# Patient Record
Sex: FEMALE | Race: ASIAN | NOT HISPANIC OR LATINO
[De-identification: names, ages, dates, MRNs, and addresses within clinical notes are randomized per-mention and may not be internally consistent; named-entity substitution may affect disease eponyms.]

---

## 2019-09-22 LAB
APPEARANCE: CLEAR
BACTERIA UR CULT: NORMAL
BACTERIA: NEGATIVE
BILIRUBIN URINE: NEGATIVE
BLOOD URINE: NEGATIVE
COLOR: COLORLESS
GLUCOSE QUALITATIVE U: NEGATIVE
HYALINE CASTS: 0 /LPF
KETONES URINE: NEGATIVE
LEUKOCYTE ESTERASE URINE: NEGATIVE
MICROSCOPIC-UA: NORMAL
NITRITE URINE: NEGATIVE
PH URINE: 7
PROTEIN URINE: NEGATIVE
RED BLOOD CELLS URINE: 0 /HPF
SPECIFIC GRAVITY URINE: 1
SQUAMOUS EPITHELIAL CELLS: 1 /HPF
UROBILINOGEN URINE: NORMAL
WHITE BLOOD CELLS URINE: 0 /HPF

## 2019-10-02 ENCOUNTER — TRANSCRIPTION ENCOUNTER (OUTPATIENT)
Age: 24
End: 2019-10-02

## 2019-10-02 ENCOUNTER — RESULT REVIEW (OUTPATIENT)
Age: 24
End: 2019-10-02

## 2019-10-07 ENCOUNTER — APPOINTMENT (OUTPATIENT)
Dept: MRI IMAGING | Facility: CLINIC | Age: 24
End: 2019-10-07

## 2019-10-09 ENCOUNTER — APPOINTMENT (OUTPATIENT)
Dept: INTERNAL MEDICINE | Facility: CLINIC | Age: 24
End: 2019-10-09
Payer: COMMERCIAL

## 2019-10-09 VITALS
SYSTOLIC BLOOD PRESSURE: 100 MMHG | DIASTOLIC BLOOD PRESSURE: 62 MMHG | BODY MASS INDEX: 32.4 KG/M2 | WEIGHT: 140 LBS | HEIGHT: 55 IN | HEART RATE: 78 BPM | OXYGEN SATURATION: 99 %

## 2019-10-09 DIAGNOSIS — L21.9 SEBORRHEIC DERMATITIS, UNSPECIFIED: ICD-10-CM

## 2019-10-09 PROCEDURE — G0444 DEPRESSION SCREEN ANNUAL: CPT | Mod: 59

## 2019-10-09 PROCEDURE — G0442 ANNUAL ALCOHOL SCREEN 15 MIN: CPT | Mod: 59

## 2019-10-09 PROCEDURE — 99385 PREV VISIT NEW AGE 18-39: CPT

## 2019-10-10 ENCOUNTER — APPOINTMENT (OUTPATIENT)
Dept: ULTRASOUND IMAGING | Facility: IMAGING CENTER | Age: 24
End: 2019-10-10
Payer: COMMERCIAL

## 2019-10-10 ENCOUNTER — OUTPATIENT (OUTPATIENT)
Dept: OUTPATIENT SERVICES | Facility: HOSPITAL | Age: 24
LOS: 1 days | End: 2019-10-10
Payer: COMMERCIAL

## 2019-10-10 DIAGNOSIS — Z00.8 ENCOUNTER FOR OTHER GENERAL EXAMINATION: ICD-10-CM

## 2019-10-10 PROCEDURE — 76856 US EXAM PELVIC COMPLETE: CPT

## 2019-10-10 PROCEDURE — 76856 US EXAM PELVIC COMPLETE: CPT | Mod: 26

## 2019-10-13 PROBLEM — L21.9 SEBORRHEIC DERMATITIS: Status: ACTIVE | Noted: 2019-10-09

## 2019-10-13 NOTE — PHYSICAL EXAM
[No Acute Distress] : no acute distress [Well Nourished] : well nourished [Well Developed] : well developed [Well-Appearing] : well-appearing [Normal Sclera/Conjunctiva] : normal sclera/conjunctiva [PERRL] : pupils equal round and reactive to light [EOMI] : extraocular movements intact [Normal Outer Ear/Nose] : the outer ears and nose were normal in appearance [Normal Oropharynx] : the oropharynx was normal [No JVD] : no jugular venous distention [No Lymphadenopathy] : no lymphadenopathy [Supple] : supple [Thyroid Normal, No Nodules] : the thyroid was normal and there were no nodules present [No Respiratory Distress] : no respiratory distress  [No Accessory Muscle Use] : no accessory muscle use [Clear to Auscultation] : lungs were clear to auscultation bilaterally [Normal Rate] : normal rate  [Regular Rhythm] : with a regular rhythm [Normal S1, S2] : normal S1 and S2 [No Murmur] : no murmur heard [No Carotid Bruits] : no carotid bruits [No Abdominal Bruit] : a ~M bruit was not heard ~T in the abdomen [No Varicosities] : no varicosities [Pedal Pulses Present] : the pedal pulses are present [No Edema] : there was no peripheral edema [No Palpable Aorta] : no palpable aorta [No Extremity Clubbing/Cyanosis] : no extremity clubbing/cyanosis [Soft] : abdomen soft [Non Tender] : non-tender [Non-distended] : non-distended [No Masses] : no abdominal mass palpated [No HSM] : no HSM [Normal Bowel Sounds] : normal bowel sounds [Normal Posterior Cervical Nodes] : no posterior cervical lymphadenopathy [Normal Anterior Cervical Nodes] : no anterior cervical lymphadenopathy [No CVA Tenderness] : no CVA  tenderness [No Spinal Tenderness] : no spinal tenderness [No Joint Swelling] : no joint swelling [Grossly Normal Strength/Tone] : grossly normal strength/tone [Coordination Grossly Intact] : coordination grossly intact [No Focal Deficits] : no focal deficits [Normal Gait] : normal gait [Deep Tendon Reflexes (DTR)] : deep tendon reflexes were 2+ and symmetric [Speech Grossly Normal] : speech grossly normal [Memory Grossly Normal] : memory grossly normal [Normal Affect] : the affect was normal [Alert and Oriented x3] : oriented to person, place, and time [Normal Insight/Judgement] : insight and judgment were intact [de-identified] : hx seb dermatitis, noabnormal hair growth

## 2019-10-13 NOTE — HISTORY OF PRESENT ILLNESS
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 Has labs drawn by her Gyn Dr Fernandez with her today.\par \par Reports irregular menses since 2013 (age19) when in nursing school. Lapses of periods would occur up to 3-4 month, max up to 1 year with no period.\par Told she might have PCOS as per her Gyn Dr Fernandez. Labs drawn by Gyn showed normal levels total and free testosterone, elevated DHEA and markedly elevated Prolactin level (866.3 ng/ml in 2018 and over 8000ng/ml in 2019.\par Imaging showed small grnuloma in liver.\par She denies any headache, visual field changes, galactorrhea,or hirsutism.\par Has MRI brain/pituitary gland scheduled.\par Gyn-\par Never sexually active.

## 2019-10-13 NOTE — REVIEW OF SYSTEMS
[Negative] : Heme/Lymph [Hot Flashes] : no hot flashes [Headache] : no headache [Dizziness] : no dizziness [Unsteady Walking] : no ataxia [FreeTextEntry8] : irregulamenses [de-identified] : seb dermatitis

## 2019-10-13 NOTE — HEALTH RISK ASSESSMENT
[No] : No [No falls in past year] : Patient reported no falls in the past year [0] : 2) Feeling down, depressed, or hopeless: Not at all (0) [Patient reported PAP Smear was normal] : Patient reported PAP Smear was normal [Patient/Caregiver not ready to engage] : Patient/Caregiver not ready to engage [] : No [de-identified] : gyn [Audit-CScore] : 0 [[id number]] : 0 [PapSmearDate] : 10/19 [PapSmearComments] : Dr Fernandez

## 2019-10-18 ENCOUNTER — OUTPATIENT (OUTPATIENT)
Dept: OUTPATIENT SERVICES | Facility: HOSPITAL | Age: 24
LOS: 1 days | End: 2019-10-18
Payer: COMMERCIAL

## 2019-10-18 ENCOUNTER — APPOINTMENT (OUTPATIENT)
Dept: MRI IMAGING | Facility: IMAGING CENTER | Age: 24
End: 2019-10-18
Payer: COMMERCIAL

## 2019-10-18 DIAGNOSIS — Z00.8 ENCOUNTER FOR OTHER GENERAL EXAMINATION: ICD-10-CM

## 2019-10-18 PROCEDURE — A9585: CPT

## 2019-10-18 PROCEDURE — 70553 MRI BRAIN STEM W/O & W/DYE: CPT | Mod: 26

## 2019-10-18 PROCEDURE — 70553 MRI BRAIN STEM W/O & W/DYE: CPT

## 2019-10-21 ENCOUNTER — FORM ENCOUNTER (OUTPATIENT)
Age: 24
End: 2019-10-21

## 2019-10-22 ENCOUNTER — OUTPATIENT (OUTPATIENT)
Dept: OUTPATIENT SERVICES | Facility: HOSPITAL | Age: 24
LOS: 1 days | End: 2019-10-22
Payer: COMMERCIAL

## 2019-10-22 ENCOUNTER — APPOINTMENT (OUTPATIENT)
Dept: ULTRASOUND IMAGING | Facility: IMAGING CENTER | Age: 24
End: 2019-10-22
Payer: COMMERCIAL

## 2019-10-22 DIAGNOSIS — K75.3 GRANULOMATOUS HEPATITIS, NOT ELSEWHERE CLASSIFIED: ICD-10-CM

## 2019-10-22 PROCEDURE — 76700 US EXAM ABDOM COMPLETE: CPT

## 2019-10-22 PROCEDURE — 76700 US EXAM ABDOM COMPLETE: CPT | Mod: 26

## 2019-10-23 LAB
ALBUMIN SERPL ELPH-MCNC: 4.8 G/DL
ALP BLD-CCNC: 84 U/L
ALT SERPL-CCNC: 12 U/L
ANION GAP SERPL CALC-SCNC: 13 MMOL/L
AST SERPL-CCNC: 34 U/L
BASOPHILS # BLD AUTO: 0.07 K/UL
BASOPHILS NFR BLD AUTO: 1 %
BILIRUB SERPL-MCNC: 0.4 MG/DL
BUN SERPL-MCNC: 13 MG/DL
CALCIUM SERPL-MCNC: 9.9 MG/DL
CHLORIDE SERPL-SCNC: 102 MMOL/L
CHOLEST SERPL-MCNC: 142 MG/DL
CHOLEST/HDLC SERPL: 2.4 RATIO
CO2 SERPL-SCNC: 25 MMOL/L
CREAT SERPL-MCNC: 0.64 MG/DL
EOSINOPHIL # BLD AUTO: 0.15 K/UL
EOSINOPHIL NFR BLD AUTO: 2.1 %
ESTIMATED AVERAGE GLUCOSE: 114 MG/DL
GLUCOSE SERPL-MCNC: 84 MG/DL
HBA1C MFR BLD HPLC: 5.6 %
HCT VFR BLD CALC: 39.6 %
HDLC SERPL-MCNC: 60 MG/DL
HGB BLD-MCNC: 12.6 G/DL
IMM GRANULOCYTES NFR BLD AUTO: 0.3 %
LDLC SERPL CALC-MCNC: 72 MG/DL
LYMPHOCYTES # BLD AUTO: 2.82 K/UL
LYMPHOCYTES NFR BLD AUTO: 39.2 %
MAN DIFF?: NORMAL
MCHC RBC-ENTMCNC: 28.8 PG
MCHC RBC-ENTMCNC: 31.8 GM/DL
MCV RBC AUTO: 90.4 FL
MONOCYTES # BLD AUTO: 0.39 K/UL
MONOCYTES NFR BLD AUTO: 5.4 %
NEUTROPHILS # BLD AUTO: 3.75 K/UL
NEUTROPHILS NFR BLD AUTO: 52 %
PLATELET # BLD AUTO: 278 K/UL
POTASSIUM SERPL-SCNC: 4.1 MMOL/L
PROLACTIN SERPL-MCNC: 2659 NG/ML
PROT SERPL-MCNC: 8.1 G/DL
RBC # BLD: 4.38 M/UL
RBC # FLD: 12.9 %
SODIUM SERPL-SCNC: 140 MMOL/L
TRIGL SERPL-MCNC: 49 MG/DL
WBC # FLD AUTO: 7.2 K/UL

## 2019-10-25 ENCOUNTER — APPOINTMENT (OUTPATIENT)
Dept: NEUROSURGERY | Facility: CLINIC | Age: 24
End: 2019-10-25

## 2019-10-28 ENCOUNTER — APPOINTMENT (OUTPATIENT)
Dept: ENDOCRINOLOGY | Facility: CLINIC | Age: 24
End: 2019-10-28
Payer: COMMERCIAL

## 2019-10-28 ENCOUNTER — RX CHANGE (OUTPATIENT)
Age: 24
End: 2019-10-28

## 2019-10-28 VITALS
HEART RATE: 88 BPM | DIASTOLIC BLOOD PRESSURE: 60 MMHG | OXYGEN SATURATION: 98 % | BODY MASS INDEX: 23.32 KG/M2 | HEIGHT: 65 IN | SYSTOLIC BLOOD PRESSURE: 108 MMHG | WEIGHT: 140 LBS

## 2019-10-28 PROCEDURE — 99205 OFFICE O/P NEW HI 60 MIN: CPT

## 2019-10-29 LAB — DHEA-SULFATE, SERUM: 494 UG/DL

## 2019-10-30 NOTE — HISTORY OF PRESENT ILLNESS
[FreeTextEntry1] : 24 year old female with no significant PMH presents for initial visit for new diagnosis of macroprolactinoma.\par \par Patient reports first started to have symptoms when started to have irregular periods in 2016. Saw PCP in 2018. Had hormone levels checked at that time and reports prolactin was 800s at that time. Reports was told likely 2/2 possible PCOS and was being monitored. \par Since then saw obgyn and prolactin was rechecked and was 2600s. 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 Reports can skip a month every 3-4 months. Spotting in between. Menarch 12. No hx of pregnancy. \par Denies galctorrhea, breast tenderness. No weight gain. No changes in hand/shoe size. Denies headaches or vision changes. Does report that went to optho a few months ago and was told that has some peripheral visual field deficits but was being monitored. Pt notices no changes in vision.\par Other labs: DHEA 494. Testosterone 26.6, Ft4 1.2, TSH 1.09, 17 hydroxyproges 23\par FSH 6.4, LH 12.6, estradiol 45\par A1C 5.6\par Also had an US pelvis showing normal ovaries.\par \par No family hx of pituitary abd. No adrenal or pancreatic issues

## 2019-10-30 NOTE — DATA REVIEWED
[FreeTextEntry1] : \par EXAM: MR BRAIN WAW IC \par \par \par PROCEDURE DATE: 10/18/2019 \par \par \par \par INTERPRETATION: Contrast-enhanced MRI of the brain. \par \par CLINICAL INDICATION: Increased prolactin levels \par \par TECHNIQUE: Multiplanar, multisequence MR images of the brain were obtained \par before and after the dynamic intravenous administration of 7 cc of Gadavist. \par 0.5 cc were discarded. Special attention was paid to the sellar and \par suprasellar regions. \par \par COMPARISON: None available \par \par FINDINGS: \par \par There is a 1.9 x 2.2 x 2.0 cm (anterior-posterior by transverse by \par craniocaudad) relatively hypoenhancing sellar and suprasellar mass, most \par consistent with the presence of a macroadenoma. \par \par The infundibulum is deviated to the right. Uniformly enhancing native \par pituitary gland is noted in the right aspect of the sella and draped over \par the right and superior aspect of the lesion. \par \par The lesion invades the left cavernous sinus. The left precavernous and \par cavernous ICA is encased by the lesion. The proximal left cavernous flow \par void is minimally narrowed. The inferior medial aspects of the supraclinoid \par internal carotid arteries approximate the lesion. \par \par There is mild mass effect upon the right aspect of the optic chiasm. \par \par No hydrocephalus, midline shift, acute intracranial hemorrhage, vasogenic \par edema, or acute territorial infarct. No focal parenchymal signal abnormality. \par \par No abnormal parenchymal or leptomeningeal enhancement. \par \par Left maxillary and bilateral ethmoid sinus mucosal thickening is present. \par The mastoid air cells are clear. The orbits and cervicomedullary junction \par are unremarkable. \par \par IMPRESSION: \par \par Findings consistent with the presence of a pituitary macroadenoma, as \par described in the report. \par \par LEIGHTON GUTIÉRREZ M.D., ATTENDING RADIOLOGIST \par This document has been electronically signed. Oct 22 2019 11:28AM \par

## 2019-10-30 NOTE — PHYSICAL EXAM
[Alert] : alert [No Acute Distress] : no acute distress [Well Nourished] : well nourished [Well Developed] : well developed [Normal Sclera/Conjunctiva] : normal sclera/conjunctiva [PERRL] : pupils equal, round and reactive to light [EOMI] : extra ocular movement intact [Normal Lips/Gums] : the lips and gums were normal [Normal Oropharynx] : the oropharynx was normal [No Neck Mass] : no neck mass was observed [Supple] : the neck was supple [No LAD] : no lymphadenopathy [Thyroid Not Enlarged] : the thyroid was not enlarged [No Thyroid Nodules] : there were no palpable thyroid nodules [No Respiratory Distress] : no respiratory distress [Normal Rate and Effort] : normal respiratory rhythm and effort [No Accessory Muscle Use] : no accessory muscle use [Clear to Auscultation] : lungs were clear to auscultation bilaterally [Normal Rate] : heart rate was normal  [Normal S1, S2] : normal S1 and S2 [Regular Rhythm] : with a regular rhythm [Murmurs] : no murmurs [No Rubs] : no pericardial rub [No Edema] : there was no peripheral edema [Normal Bowel Sounds] : normal bowel sounds [Not Tender] : non-tender [Soft] : abdomen soft [Not Distended] : not distended [Post Cervical Nodes] : posterior cervical nodes [Anterior Cervical Nodes] : anterior cervical nodes [Normal] : normal and non tender [No Stigmata of Cushings Syndrome] : no stigmata of cushings syndrome [Normal Gait] : normal gait [No Joint Swelling] : no joint swelling seen [No Clubbing, Cyanosis] : no clubbing  or cyanosis of the fingernails [Normal Strength/Tone] : muscle strength and tone were normal [No Rash] : no rash [Cranial Nerves Intact] : cranial nerves 2-12 were intact [Normal Reflexes] : deep tendon reflexes were 2+ and symmetric [No Motor Deficits] : the motor exam was normal [No Tremors] : no tremors [Oriented x3] : oriented to person, place, and time [Normal Insight/Judgement] : insight and judgment were intact [Normal Affect] : the affect was normal [Normal Mood] : the mood was normal [Abdominal Striae] : no abdominal striae [Hirsutism] : no hirsutism

## 2019-10-30 NOTE — ASSESSMENT
[FreeTextEntry1] : 24 year old female with no significant PMH presents for initial visit for new diagnosis of macroprolactinoma.\par \par With with pituitary macroadenoma likely prolactinoma with prolactin levels >200.\par Patient with normal Ft4, LH/FSH/estradiol low.\par For completeness, will check AM cortisol and ACTH as well as midnight salivary cortisol to r/o for cortisol secretion\par Check IGF-1 level\par Will start low dose cabergoline for now 0.25mg twice a week and will uptitrate further based on prolactin. Plan to repeat prolactin in 1 month on therapy. Risks/benefits of starting therapy discussed with pt\par Likely plan for repeat MRI in 6 mos\par C/w optho f/u for formal visual field testing\par D/w patient that should also establish care with neurosurgery. Will for now start cabergoline and expect for good response, however given extensive size of adenoma should see nsx as well for monitoring.\par \par F/U in 1 month\par \par D/W / Cristina

## 2019-11-08 LAB
ACTH SER-ACNC: 32 PG/ML
CORTIS SAL-MCNC: NORMAL
CORTIS SERPL-MCNC: 13.9 UG/DL
IGF BP1 SERPL-MCNC: 249 NG/ML

## 2019-11-11 LAB
MONOMERIC PROLACTIN (ICMA)*: 2366 NG/ML
PERCENT MACROPROLACTIN: 3 %
PROLACTIN, SERUM (ICMA)*: 2428 NG/ML

## 2019-11-20 ENCOUNTER — APPOINTMENT (OUTPATIENT)
Dept: ENDOCRINOLOGY | Facility: CLINIC | Age: 24
End: 2019-11-20
Payer: COMMERCIAL

## 2019-11-20 VITALS
HEART RATE: 80 BPM | BODY MASS INDEX: 23.32 KG/M2 | OXYGEN SATURATION: 96 % | DIASTOLIC BLOOD PRESSURE: 60 MMHG | SYSTOLIC BLOOD PRESSURE: 110 MMHG | WEIGHT: 140 LBS | HEIGHT: 65 IN

## 2019-11-20 PROCEDURE — 99213 OFFICE O/P EST LOW 20 MIN: CPT

## 2019-11-20 NOTE — PHYSICAL EXAM
[Alert] : alert [No Acute Distress] : no acute distress [Well Nourished] : well nourished [Well Developed] : well developed [Normal Sclera/Conjunctiva] : normal sclera/conjunctiva [EOMI] : extra ocular movement intact [No Proptosis] : no proptosis [Normal Oropharynx] : the oropharynx was normal [Thyroid Not Enlarged] : the thyroid was not enlarged [No Thyroid Nodules] : there were no palpable thyroid nodules [No Respiratory Distress] : no respiratory distress [No Accessory Muscle Use] : no accessory muscle use [Clear to Auscultation] : lungs were clear to auscultation bilaterally [Normal S1, S2] : normal S1 and S2 [Normal Rate] : heart rate was normal  [Regular Rhythm] : with a regular rhythm [Pedal Pulses Normal] : the pedal pulses are present [No Edema] : there was no peripheral edema [Not Tender] : non-tender [Normal Bowel Sounds] : normal bowel sounds [Soft] : abdomen soft [Post Cervical Nodes] : posterior cervical nodes [Not Distended] : not distended [Axillary Nodes] : axillary nodes [Anterior Cervical Nodes] : anterior cervical nodes [No Spinal Tenderness] : no spinal tenderness [Spine Straight] : spine straight [Normal] : normal and non tender [Normal Gait] : normal gait [No Stigmata of Cushings Syndrome] : no stigmata of cushings syndrome [No Rash] : no rash [Normal Strength/Tone] : muscle strength and tone were normal [No Tremors] : no tremors [Normal Reflexes] : deep tendon reflexes were 2+ and symmetric [Oriented x3] : oriented to person, place, and time [Acanthosis Nigricans] : no acanthosis nigricans

## 2019-11-20 NOTE — ASSESSMENT
[FreeTextEntry1] : Ms. TR MALONE is 24 year old here for follow up prolactinoma. \par \par #1 Prolactinoma\par Will check prolactin level today, \par Likely will need to increase cabergoline dosing due to significant elevated level. \par Will give referral to neuro opthal for visual field testing. \par Risks/benefits of starting therapy discussed with pt\par Likely plan for repeat MRI in 6 mos\par C/w optho f/u for formal visual field testing\par D/w patient that should also establish care with neurosurgery. Will for now start cabergoline and expect for good response, however given extensive size of adenoma should see neurosurgery as well for monitoring.\par \par \par \par

## 2019-11-20 NOTE — HISTORY OF PRESENT ILLNESS
[FreeTextEntry1] : 24 year old female with no significant PMH presents for initial visit for new diagnosis of macroprolactinoma.\par \par Patient reports first started to have symptoms when started to have irregular periods in 2016. Saw PCP in 2018. Had hormone levels checked at that time and reports prolactin was 800s at that time. Reports was told likely 2/2 possible PCOS and was being monitored. \par \par Since then saw OBGYN and prolactin was rechecked and was 2600s. 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 Reports can skip a month every 3-4 months. Spotting in between. Menarche 12. No prior history of pregnancy. \par \par Denies galactorrhea, breast tenderness. No weight gain. No changes in hand/shoe size. Denies headaches or vision changes. Does report that went to optho a few months ago and was told that has some peripheral visual field deficits but was being monitored. Pt notices no changes in vision.  She has not had visual field testing done yet.  \par \par No family hx of pituitary abd. No adrenal or pancreatic issues \par \par She didn't noticed any signficant changes since starting Cabergoline.  She does feel a little tired. \par

## 2019-11-21 ENCOUNTER — RX RENEWAL (OUTPATIENT)
Age: 24
End: 2019-11-21

## 2019-11-21 LAB — PROLACTIN SERPL-MCNC: 593 NG/ML

## 2020-01-22 ENCOUNTER — APPOINTMENT (OUTPATIENT)
Dept: ENDOCRINOLOGY | Facility: CLINIC | Age: 25
End: 2020-01-22
Payer: COMMERCIAL

## 2020-01-22 VITALS
DIASTOLIC BLOOD PRESSURE: 80 MMHG | WEIGHT: 136 LBS | BODY MASS INDEX: 22.66 KG/M2 | OXYGEN SATURATION: 98 % | HEART RATE: 75 BPM | HEIGHT: 65 IN | SYSTOLIC BLOOD PRESSURE: 110 MMHG

## 2020-01-22 PROCEDURE — 99213 OFFICE O/P EST LOW 20 MIN: CPT

## 2020-01-22 NOTE — PHYSICAL EXAM
[Alert] : alert [No Acute Distress] : no acute distress [Well Nourished] : well nourished [Normal Sclera/Conjunctiva] : normal sclera/conjunctiva [Well Developed] : well developed [EOMI] : extra ocular movement intact [No Proptosis] : no proptosis [Normal Oropharynx] : the oropharynx was normal [Thyroid Not Enlarged] : the thyroid was not enlarged [No Respiratory Distress] : no respiratory distress [No Thyroid Nodules] : there were no palpable thyroid nodules [No Accessory Muscle Use] : no accessory muscle use [Clear to Auscultation] : lungs were clear to auscultation bilaterally [Normal Rate] : heart rate was normal  [Normal S1, S2] : normal S1 and S2 [Regular Rhythm] : with a regular rhythm [Pedal Pulses Normal] : the pedal pulses are present [No Edema] : there was no peripheral edema [Normal Bowel Sounds] : normal bowel sounds [Not Tender] : non-tender [Soft] : abdomen soft [Not Distended] : not distended [Post Cervical Nodes] : posterior cervical nodes [Anterior Cervical Nodes] : anterior cervical nodes [Axillary Nodes] : axillary nodes [Normal] : normal and non tender [No Spinal Tenderness] : no spinal tenderness [Spine Straight] : spine straight [No Stigmata of Cushings Syndrome] : no stigmata of cushings syndrome [Normal Gait] : normal gait [Normal Strength/Tone] : muscle strength and tone were normal [No Rash] : no rash [Normal Reflexes] : deep tendon reflexes were 2+ and symmetric [No Tremors] : no tremors [Oriented x3] : oriented to person, place, and time [Acanthosis Nigricans] : no acanthosis nigricans

## 2020-01-22 NOTE — DATA REVIEWED
[FreeTextEntry1] : EXAM: MR BRAIN WAW IC \par \par PROCEDURE DATE: 10/18/2019 \par \par INTERPRETATION: Contrast-enhanced MRI of the brain. \par \par CLINICAL INDICATION: Increased prolactin levels \par \par TECHNIQUE: Multiplanar, multisequence MR images of the brain were obtained \par before and after the dynamic intravenous administration of 7 cc of Gadavist. \par 0.5 cc were discarded. Special attention was paid to the sellar and \par suprasellar regions. \par \par COMPARISON: None available \par \par FINDINGS: \par \par There is a 1.9 x 2.2 x 2.0 cm (anterior-posterior by transverse by \par craniocaudad) relatively hypoenhancing sellar and suprasellar mass, most \par consistent with the presence of a macroadenoma. \par \par The infundibulum is deviated to the right. Uniformly enhancing native \par pituitary gland is noted in the right aspect of the sella and draped over \par the right and superior aspect of the lesion. \par \par The lesion invades the left cavernous sinus. The left precavernous and \par cavernous ICA is encased by the lesion. The proximal left cavernous flow \par void is minimally narrowed. The inferior medial aspects of the supraclinoid \par internal carotid arteries approximate the lesion. \par \par There is mild mass effect upon the right aspect of the optic chiasm. \par \par No hydrocephalus, midline shift, acute intracranial hemorrhage, vasogenic \par edema, or acute territorial infarct. No focal parenchymal signal abnormality. \par \par No abnormal parenchymal or leptomeningeal enhancement. \par \par Left maxillary and bilateral ethmoid sinus mucosal thickening is present. \par The mastoid air cells are clear. The orbits and cervicomedullary junction \par are unremarkable. \par \par IMPRESSION: \par \par Findings consistent with the presence of a pituitary macroadenoma, as \par described in the report. \par \par LEIGHTON GUTIÉRREZ M.D., ATTENDING RADIOLOGIST \par This document has been electronically signed. Oct 22 2019 11:28AM \par

## 2020-01-22 NOTE — ASSESSMENT
[FreeTextEntry1] : Ms. TR MALONE is 24 year old here for follow up prolactinoma. \par \par #1 Prolactinoma\par Will check prolactin level today, will likely need to increase Cabergoline dosing if level is still not at goal.  \par Will give referral to neuro Ophthalmology for complete visual field testing and to establish care. \par \par \par #2 Elevated DHEAS level\par May be secondary to elevated prolactin, will repeat level now prolactin level is more stable.  \par \par

## 2020-01-22 NOTE — HISTORY OF PRESENT ILLNESS
[FreeTextEntry1] : Ms. TR MALONE is a  24 year old female with no significant PMH presents for follow up visit for new macroprolactinoma.  Diagnosed in Oct 2019.  \par \par Patient reports first started to have symptoms when started to have irregular periods in 2016. Saw PCP in 2018. Had hormone levels checked at that time and reports prolactin was 800s at that time. Reports was told likely 2/2 possible PCOS and was being monitored. \par \par Since then saw OBGYN and prolactin was rechecked and was 2600s. 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 Reports can skip a month every 3-4 months. Spotting in between. Menarche 12. No prior history of pregnancy.   \par \par She states that her last menstrual period was Dec 5th, 2019, prior to that was Oct 17, 2019.  \par \par She made an appointment to see Ophthalmology but will be cancelling that appointment.  She has not establish care with Neuro-ophthalmology yet.   \par \par She reports feeling well.  Just returned from a 2 week trip from [place].  Denies any headache, any changes in her vision.\par

## 2020-01-23 ENCOUNTER — APPOINTMENT (OUTPATIENT)
Dept: OPHTHALMOLOGY | Facility: CLINIC | Age: 25
End: 2020-01-23

## 2020-01-24 LAB
PROLACTIN SERPL-MCNC: 300 NG/ML
PROLACTIN SERPL-MCNC: 339 NG/ML

## 2020-01-28 LAB — DHEA-SULFATE, SERUM: 301 UG/DL

## 2020-04-20 ENCOUNTER — TRANSCRIPTION ENCOUNTER (OUTPATIENT)
Age: 25
End: 2020-04-20

## 2020-04-21 ENCOUNTER — TRANSCRIPTION ENCOUNTER (OUTPATIENT)
Age: 25
End: 2020-04-21

## 2020-04-26 ENCOUNTER — MESSAGE (OUTPATIENT)
Age: 25
End: 2020-04-26

## 2020-04-29 ENCOUNTER — APPOINTMENT (OUTPATIENT)
Dept: OPHTHALMOLOGY | Facility: CLINIC | Age: 25
End: 2020-04-29

## 2020-04-29 ENCOUNTER — APPOINTMENT (OUTPATIENT)
Dept: ENDOCRINOLOGY | Facility: CLINIC | Age: 25
End: 2020-04-29
Payer: COMMERCIAL

## 2020-04-29 PROCEDURE — 99213 OFFICE O/P EST LOW 20 MIN: CPT | Mod: 95

## 2020-04-29 NOTE — REASON FOR VISIT
[Home] : at home, [unfilled] , at the time of the visit. [Patient] : the patient [Medical Office: ([facility])___] : at the medical office located in

## 2020-04-29 NOTE — ASSESSMENT
[FreeTextEntry1] : Ms. TR MALONE is 24 year old here for follow up prolactinoma. \par \par #1 Prolactinoma\par Continue with cabergoline 0.5 mg twice weekly.  Patient has been tolerating the medication well.\par Repeat MRI of the pituitary gland in 2021.\par Patient will reschedule her neuro-ophthalmology visit.\par Repeat prolactin level, check LH, FSH and estradiol level as well.\par \par \par #2 Elevated DHEAS level\par May be secondary to elevated prolactin, we will repeat DHEAS level again today.  It was normal in January 2020.\par \par Endocrine follow-up in 4 months.\par

## 2020-04-29 NOTE — HISTORY OF PRESENT ILLNESS
[FreeTextEntry1] : Ms. TR MALONE is a  24 year old female with no significant PMH presents for follow up visit for new macroprolactinoma.  Diagnosed in Oct 2019.  \par \par Patient reports first started to have symptoms when started to have irregular periods in 2016. Saw PCP in 2018. Had hormone levels checked at that time and reports prolactin was 800s at that time. Reports was told likely 2/2 possible PCOS and was being monitored. \par \par Since then saw OBGYN and prolactin was rechecked and was 2600s. 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 Reports can skip a month every 3-4 months. Spotting in between. Menarche 12. No prior history of pregnancy.   \par \par She states that her last menstrual period was Dec 5th, 2019, prior to that was Oct 17, 2019.  \par \par She made an appointment to see Ophthalmology but will be cancelling that appointment.  She had an appointment with neuro-ophthalmologist today.  But she will be canceling that due to the current COVID-19 pandemic.  She will be rescheduling in a couple of months.\par \par She reports feeling well, she reported that her menses had restarted since February 2020.  She has been getting monthly menstrual.,  Although they are not exactly on the same day of the month.  \par

## 2020-04-29 NOTE — PHYSICAL EXAM
[Well Nourished] : well nourished [Alert] : alert [EOMI] : extra ocular movement intact [Normal Hearing] : hearing was normal [No Lid Lag] : no lid lag [No Neck Mass] : no neck mass was observed [No Accessory Muscle Use] : no accessory muscle use [No Respiratory Distress] : no respiratory distress [Normal Rate and Effort] : normal respiratory rate and effort [No Motor Deficits] : the motor exam was normal [No Clubbing, Cyanosis] : no clubbing  or cyanosis of the fingernails [Oriented x3] : oriented to person, place, and time [No Tremors] : no tremors [Normal Affect] : the affect was normal [Normal Insight/Judgement] : insight and judgment were intact [Normal Mood] : the mood was normal [de-identified] : Unable to perform auscultations [de-identified] : Unable to perform auscultation

## 2020-05-01 ENCOUNTER — APPOINTMENT (OUTPATIENT)
Dept: DISASTER EMERGENCY | Facility: CLINIC | Age: 25
End: 2020-05-01

## 2020-05-03 LAB
SARS-COV-2 IGG SERPL IA-ACNC: 0.2 RATIO
SARS-COV-2 IGG SERPL QL IA: NEGATIVE

## 2020-05-07 ENCOUNTER — APPOINTMENT (OUTPATIENT)
Dept: INTERNAL MEDICINE | Facility: CLINIC | Age: 25
End: 2020-05-07
Payer: COMMERCIAL

## 2020-05-07 PROCEDURE — 99214 OFFICE O/P EST MOD 30 MIN: CPT | Mod: 95

## 2020-05-07 NOTE — HISTORY OF PRESENT ILLNESS
[Home] : at home, [unfilled] , at the time of the visit. 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 no weakness in dorsiflexion of toes,therefore unlikely sciatica\par \par Total visit time 25 min

## 2020-05-07 NOTE — PHYSICAL EXAM
[No Spinal Tenderness] : no spinal tenderness [de-identified] : able to flex, neg SLR, noral strenth of both big toes

## 2020-05-13 LAB
DHEA-SULFATE, SERUM: 612 UG/DL
FSH SERPL-MCNC: 4.8 IU/L
LH SERPL-ACNC: 11.9 IU/L
PROLACTIN SERPL-MCNC: 287 NG/ML
T4 FREE SERPL-MCNC: 1.1 NG/DL

## 2020-06-04 ENCOUNTER — TRANSCRIPTION ENCOUNTER (OUTPATIENT)
Age: 25
End: 2020-06-04

## 2020-06-12 ENCOUNTER — LABORATORY RESULT (OUTPATIENT)
Age: 25
End: 2020-06-12

## 2020-06-19 ENCOUNTER — RESULT REVIEW (OUTPATIENT)
Age: 25
End: 2020-06-19

## 2020-06-19 ENCOUNTER — APPOINTMENT (OUTPATIENT)
Dept: MRI IMAGING | Facility: IMAGING CENTER | Age: 25
End: 2020-06-19
Payer: COMMERCIAL

## 2020-06-19 ENCOUNTER — OUTPATIENT (OUTPATIENT)
Dept: OUTPATIENT SERVICES | Facility: HOSPITAL | Age: 25
LOS: 1 days | End: 2020-06-19
Payer: COMMERCIAL

## 2020-06-19 DIAGNOSIS — D35.2 BENIGN NEOPLASM OF PITUITARY GLAND: ICD-10-CM

## 2020-06-19 PROCEDURE — A9585: CPT

## 2020-06-19 PROCEDURE — 70553 MRI BRAIN STEM W/O & W/DYE: CPT | Mod: 26

## 2020-06-19 PROCEDURE — 70553 MRI BRAIN STEM W/O & W/DYE: CPT

## 2020-09-14 ENCOUNTER — TRANSCRIPTION ENCOUNTER (OUTPATIENT)
Age: 25
End: 2020-09-14

## 2020-09-17 LAB — PROLACTIN SERPL-MCNC: 190 NG/ML

## 2020-09-25 LAB — DHEA-SULFATE, SERUM: 451 UG/DL

## 2020-10-12 ENCOUNTER — APPOINTMENT (OUTPATIENT)
Dept: INTERNAL MEDICINE | Facility: CLINIC | Age: 25
End: 2020-10-12
Payer: COMMERCIAL

## 2020-10-12 DIAGNOSIS — G57.00 LESION OF SCIATIC NERVE, UNSPECIFIED LOWER LIMB: ICD-10-CM

## 2020-10-12 DIAGNOSIS — M54.16 RADICULOPATHY, LUMBAR REGION: ICD-10-CM

## 2020-10-12 PROCEDURE — 99443: CPT

## 2020-10-12 PROCEDURE — 99212 OFFICE O/P EST SF 10 MIN: CPT | Mod: 95

## 2020-10-21 ENCOUNTER — APPOINTMENT (OUTPATIENT)
Dept: INTERNAL MEDICINE | Facility: CLINIC | Age: 25
End: 2020-10-21
Payer: COMMERCIAL

## 2020-10-21 VITALS
HEIGHT: 65 IN | BODY MASS INDEX: 21.83 KG/M2 | WEIGHT: 131 LBS | HEART RATE: 86 BPM | DIASTOLIC BLOOD PRESSURE: 60 MMHG | OXYGEN SATURATION: 98 % | SYSTOLIC BLOOD PRESSURE: 90 MMHG

## 2020-10-21 PROCEDURE — G0444 DEPRESSION SCREEN ANNUAL: CPT | Mod: NC,59

## 2020-10-21 PROCEDURE — 99395 PREV VISIT EST AGE 18-39: CPT | Mod: 25

## 2020-10-21 PROCEDURE — 99072 ADDL SUPL MATRL&STAF TM PHE: CPT

## 2020-10-21 PROCEDURE — G0442 ANNUAL ALCOHOL SCREEN 15 MIN: CPT | Mod: NC

## 2020-10-21 NOTE — PHYSICAL EXAM
[No Acute Distress] : no acute distress [Well Nourished] : well nourished [Well Developed] : well developed [Well-Appearing] : well-appearing [Normal Sclera/Conjunctiva] : normal sclera/conjunctiva [PERRL] : pupils equal round and reactive to light [EOMI] : extraocular movements intact [Normal Outer Ear/Nose] : the outer ears and nose were normal in appearance [Normal Oropharynx] : the oropharynx was normal [No JVD] : no jugular venous distention [No Lymphadenopathy] : no lymphadenopathy [Supple] : supple [Thyroid Normal, No Nodules] : the thyroid was normal and there were no nodules present [No Respiratory Distress] : no respiratory distress  [No Accessory Muscle Use] : no accessory muscle use [Clear to Auscultation] : lungs were clear to auscultation bilaterally [Normal Rate] : normal rate  [Regular Rhythm] : with a regular rhythm [Normal S1, S2] : normal S1 and S2 [No Murmur] : no murmur heard [No Carotid Bruits] : no carotid bruits [No Abdominal Bruit] : a ~M bruit was not heard ~T in the abdomen [No Varicosities] : no varicosities [Pedal Pulses Present] : the pedal pulses are present [No Edema] : there was no peripheral edema [No Palpable Aorta] : no palpable aorta [No Extremity Clubbing/Cyanosis] : no extremity clubbing/cyanosis [No Axillary Lymphadenopathy] : no axillary lymphadenopathy [Soft] : abdomen soft [Non Tender] : non-tender [Non-distended] : non-distended [No Masses] : no abdominal mass palpated [No HSM] : no HSM [Normal Bowel Sounds] : normal bowel sounds [Normal Posterior Cervical Nodes] : no posterior cervical lymphadenopathy [Normal Anterior Cervical Nodes] : no anterior cervical lymphadenopathy [No CVA Tenderness] : no CVA  tenderness [No Spinal Tenderness] : no spinal tenderness [No Joint Swelling] : no joint swelling [Grossly Normal Strength/Tone] : grossly normal strength/tone [No Rash] : no rash [Coordination Grossly Intact] : coordination grossly intact [No Focal Deficits] : no focal deficits [Normal Gait] : normal gait [Normal Affect] : the affect was normal [Normal Insight/Judgement] : insight and judgment were intact

## 2020-10-21 NOTE — HISTORY OF PRESENT ILLNESS
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

## 2020-10-21 NOTE — HEALTH RISK ASSESSMENT
[No] : No [No falls in past year] : Patient reported no falls in the past year [0] : 2) Feeling down, depressed, or hopeless: Not at all (0) [] : No [Audit-CScore] : 0 [[id number]] : 0

## 2020-11-18 ENCOUNTER — APPOINTMENT (OUTPATIENT)
Dept: OPHTHALMOLOGY | Facility: CLINIC | Age: 25
End: 2020-11-18
Payer: COMMERCIAL

## 2020-11-18 ENCOUNTER — APPOINTMENT (OUTPATIENT)
Dept: NEUROSURGERY | Facility: CLINIC | Age: 25
End: 2020-11-18
Payer: COMMERCIAL

## 2020-11-18 ENCOUNTER — NON-APPOINTMENT (OUTPATIENT)
Age: 25
End: 2020-11-18

## 2020-11-18 VITALS
SYSTOLIC BLOOD PRESSURE: 99 MMHG | BODY MASS INDEX: 21.83 KG/M2 | DIASTOLIC BLOOD PRESSURE: 65 MMHG | HEART RATE: 85 BPM | WEIGHT: 131 LBS | HEIGHT: 65 IN

## 2020-11-18 VITALS — TEMPERATURE: 97.3 F

## 2020-11-18 PROCEDURE — 99203 OFFICE O/P NEW LOW 30 MIN: CPT

## 2020-11-18 PROCEDURE — 92083 EXTENDED VISUAL FIELD XM: CPT

## 2020-11-18 PROCEDURE — 92133 CPTRZD OPH DX IMG PST SGM ON: CPT

## 2020-11-18 PROCEDURE — 92004 COMPRE OPH EXAM NEW PT 1/>: CPT

## 2020-11-18 NOTE — ASSESSMENT
[FreeTextEntry1] : 25 year old female with low back pain with history of radicular pain with possible HNP vs. piriformis syndrome.  Will obtain MRI lumbar spine to help delineate the exact pathology.  She will return to see me once she has completed the study so that we may review the results and discuss her further treatment options.

## 2020-11-18 NOTE — HISTORY OF PRESENT ILLNESS
[Back] : back [___ mths] : [unfilled] month(s) ago [2] : a current pain level of 2/10 [10] : a maximum pain level of 10/10 [Left] : left [Posterior] : posterior aspect of the [Feet] : feet [Tingling] : tingling [Insomnia] : insomnia [PT] : PT [Medications] : medications [de-identified] : "pinching" [FreeTextEntry2] : left leg [FreeTextEntry3] : sitting on a hard surface or leaning against a wall with legs outstretched [FreeTextEntry4] : stretching, piriformis stretch [FreeTextEntry6] : Meloxicam

## 2020-11-18 NOTE — CONSULT LETTER
[Dear  ___] : Dear ~CINDY, [Consult Letter:] : I had the pleasure of evaluating your patient, [unfilled]. [Please see my note below.] : Please see my note below. [Consult Closing:] : Thank you very much for allowing me to participate in the care of this patient.  If you have any questions, please do not hesitate to contact me. [Sincerely,] : Sincerely, [FreeTextEntry2] : Sherlyn Del Rosario MD\par [address]. \[place] [address] [FreeTextEntry3] : Jluis

## 2020-12-08 ENCOUNTER — OUTPATIENT (OUTPATIENT)
Dept: OUTPATIENT SERVICES | Facility: HOSPITAL | Age: 25
LOS: 1 days | End: 2020-12-08
Payer: COMMERCIAL

## 2020-12-08 ENCOUNTER — APPOINTMENT (OUTPATIENT)
Dept: MRI IMAGING | Facility: IMAGING CENTER | Age: 25
End: 2020-12-08
Payer: COMMERCIAL

## 2020-12-08 DIAGNOSIS — M54.16 RADICULOPATHY, LUMBAR REGION: ICD-10-CM

## 2020-12-08 PROCEDURE — 72148 MRI LUMBAR SPINE W/O DYE: CPT

## 2020-12-09 ENCOUNTER — RESULT REVIEW (OUTPATIENT)
Age: 25
End: 2020-12-09

## 2020-12-09 PROCEDURE — 72148 MRI LUMBAR SPINE W/O DYE: CPT | Mod: 26

## 2020-12-18 ENCOUNTER — APPOINTMENT (OUTPATIENT)
Dept: NEUROSURGERY | Facility: CLINIC | Age: 25
End: 2020-12-18
Payer: COMMERCIAL

## 2020-12-18 VITALS
DIASTOLIC BLOOD PRESSURE: 61 MMHG | BODY MASS INDEX: 21.66 KG/M2 | SYSTOLIC BLOOD PRESSURE: 110 MMHG | HEART RATE: 75 BPM | WEIGHT: 130 LBS | HEIGHT: 65 IN

## 2020-12-18 VITALS — TEMPERATURE: 97 F

## 2020-12-18 DIAGNOSIS — M54.5 LOW BACK PAIN: ICD-10-CM

## 2020-12-18 DIAGNOSIS — G89.29 LOW BACK PAIN: ICD-10-CM

## 2020-12-18 PROCEDURE — 99072 ADDL SUPL MATRL&STAF TM PHE: CPT

## 2020-12-18 PROCEDURE — 99213 OFFICE O/P EST LOW 20 MIN: CPT

## 2020-12-18 NOTE — ASSESSMENT
[FreeTextEntry1] : 25 year old female with low back pain and lumbar radicular pain secondary to disc protrusion.  She will continue with PT and return to see me at the earliest sign that her pain worsens for further treatment as necessary.

## 2020-12-18 NOTE — REASON FOR VISIT
[Follow-Up: _____] : a [unfilled] follow-up visit [FreeTextEntry1] : Patient returns after completing MRI lumbar spine.  She has been going to PT and her pain has started to centralize.  She reports recently changing her mattress.  She is here to discuss.

## 2020-12-18 NOTE — DATA REVIEWED
[de-identified] : \par  MR Lumbar Spine No Cont             Final\par \par No Documents Attached\par \par \par \par \par   EXAM:  MR SPINE LUMBAR\par \par \par PROCEDURE DATE:  12/09/2020\par \par \par \par INTERPRETATION:  LUMBOSACRAL SPINE MRI\par \par CLINICAL INFORMATION: 25-year-old female with 6 months of low back pain with radiation to posterior aspect of left foot.\par TECHNIQUE: Multiplanar, multisequence MR imaging was obtained of the lumbosacral spine.\par COMPARISON: None available.\par \par FINDINGS:\par \par SPINAL SEGMENTATION: 5 lumbar vertebrae are present.\par SPINAL ALIGNMENT: Mild leftward curvature. No spondylolisthesis.\par MARROW: No fractures or osteonecrosis.\par DISTAL CORD AND CONUS: Conus ends at L1.\par DISC SPACES: Disc desiccation of L4-L5.\par PARASPINAL MUSCLE AND SOFT TISSUES: Unremarkable.\par INTRAABDOMINAL/INTRAPELVIC SOFT TISSUES: Unremarkable.\par \par DISC LEVEL EVALUATION:\par \par T12/L1: No disc bulging/herniation. No stenosis.\par L1/L2: No disc bulging/herniation. No stenosis.\par L3/L4: No disc bulging/herniation. No stenosis.\par L4/L5: There is a small to moderate posterior disc protrusion that is slightly asymmetric to the left with a left-sided fissure. This causes left paracentral stenosis with impingement on the descending left L5 nerve root. There is mild central canal stenosis and minimal right paracentral stenosis. There is mild facet arthrosis present.\par L5/S1: Normal\par \par IMPRESSION:\par Disc protrusion at L4-L5 that is slightly asymmetric to the left and appears to impinge on the descending left L5 nerve root.\par \par \par \par \par \par YAMEL BLAIR MD; Resident Radiology\par This document has been electronically signed.\par URIEL TOPETE MD; Attending Radiologist\par This document has been electronically signed. Dec  9 2020  4:13PM\par \par  \par \par  Ordered by: ASHA CA       Collected/Examined: [id number] 10:54AM       \par Verified by: ASHA CA [id number] 10:41AM       \par  Result Communication: Schedule appointment to discuss results;\par Stage: Final       \par  Performed at: [facility] [place] at the [facility]       Resulted: [id number] 04:17PM       Last Updated: 10Dec2020 10:41AM       Accession: [id number]

## 2020-12-20 LAB
25(OH)D3 SERPL-MCNC: 41.7 NG/ML
ALBUMIN SERPL ELPH-MCNC: 4.7 G/DL
ALP BLD-CCNC: 67 U/L
ALT SERPL-CCNC: 8 U/L
ANION GAP SERPL CALC-SCNC: 10 MMOL/L
AST SERPL-CCNC: 24 U/L
BASOPHILS # BLD AUTO: 0.04 K/UL
BASOPHILS NFR BLD AUTO: 0.6 %
BILIRUB SERPL-MCNC: 0.7 MG/DL
BUN SERPL-MCNC: 12 MG/DL
CALCIUM SERPL-MCNC: 9.6 MG/DL
CHLORIDE SERPL-SCNC: 105 MMOL/L
CHOLEST SERPL-MCNC: 130 MG/DL
CO2 SERPL-SCNC: 25 MMOL/L
CREAT SERPL-MCNC: 0.67 MG/DL
DHEA-SULFATE, SERUM: 304 UG/DL
EOSINOPHIL # BLD AUTO: 0.13 K/UL
EOSINOPHIL NFR BLD AUTO: 2.1 %
ESTIMATED AVERAGE GLUCOSE: 111 MG/DL
GLUCOSE SERPL-MCNC: 89 MG/DL
HBA1C MFR BLD HPLC: 5.5 %
HCT VFR BLD CALC: 37.4 %
HDLC SERPL-MCNC: 61 MG/DL
HGB BLD-MCNC: 11.7 G/DL
IMM GRANULOCYTES NFR BLD AUTO: 0.3 %
LDLC SERPL CALC-MCNC: 62 MG/DL
LYMPHOCYTES # BLD AUTO: 2.13 K/UL
LYMPHOCYTES NFR BLD AUTO: 34 %
MAN DIFF?: NORMAL
MCHC RBC-ENTMCNC: 29 PG
MCHC RBC-ENTMCNC: 31.3 GM/DL
MCV RBC AUTO: 92.8 FL
MONOCYTES # BLD AUTO: 0.37 K/UL
MONOCYTES NFR BLD AUTO: 5.9 %
NEUTROPHILS # BLD AUTO: 3.58 K/UL
NEUTROPHILS NFR BLD AUTO: 57.1 %
NONHDLC SERPL-MCNC: 69 MG/DL
PLATELET # BLD AUTO: 265 K/UL
POTASSIUM SERPL-SCNC: 4.5 MMOL/L
PROLACTIN SERPL-MCNC: 99.2 NG/ML
PROT SERPL-MCNC: 7.2 G/DL
RBC # BLD: 4.03 M/UL
RBC # FLD: 13.4 %
SODIUM SERPL-SCNC: 140 MMOL/L
TRIGL SERPL-MCNC: 36 MG/DL
TSH SERPL-ACNC: 1.03 UIU/ML
WBC # FLD AUTO: 6.27 K/UL

## 2020-12-29 ENCOUNTER — TRANSCRIPTION ENCOUNTER (OUTPATIENT)
Age: 25
End: 2020-12-29

## 2021-01-04 ENCOUNTER — TRANSCRIPTION ENCOUNTER (OUTPATIENT)
Age: 26
End: 2021-01-04

## 2021-01-05 PROBLEM — M54.16 LUMBAR RADICULOPATHY: Status: ACTIVE | Noted: 2020-11-18

## 2021-01-05 PROBLEM — G57.00 PYRIFORMIS SYNDROME: Status: ACTIVE | Noted: 2020-05-07

## 2021-01-05 NOTE — HISTORY OF PRESENT ILLNESS
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 Pain aggravated by sitting on hard surface, relief with doing piriformis stretch. Issues with sleep.\par Sits on hard chair at home and stands mostly at work as moving around and \par Use meloxicam with partial relief.busy

## 2021-03-24 ENCOUNTER — APPOINTMENT (OUTPATIENT)
Dept: ENDOCRINOLOGY | Facility: CLINIC | Age: 26
End: 2021-03-24
Payer: COMMERCIAL

## 2021-03-24 VITALS
WEIGHT: 133 LBS | DIASTOLIC BLOOD PRESSURE: 73 MMHG | SYSTOLIC BLOOD PRESSURE: 89 MMHG | HEART RATE: 75 BPM | OXYGEN SATURATION: 98 % | BODY MASS INDEX: 22.16 KG/M2 | HEIGHT: 65 IN | TEMPERATURE: 98.1 F

## 2021-03-24 PROCEDURE — 99214 OFFICE O/P EST MOD 30 MIN: CPT

## 2021-03-24 PROCEDURE — 99072 ADDL SUPL MATRL&STAF TM PHE: CPT

## 2021-03-29 ENCOUNTER — TRANSCRIPTION ENCOUNTER (OUTPATIENT)
Age: 26
End: 2021-03-29

## 2021-03-30 ENCOUNTER — TRANSCRIPTION ENCOUNTER (OUTPATIENT)
Age: 26
End: 2021-03-30

## 2021-03-31 ENCOUNTER — APPOINTMENT (OUTPATIENT)
Dept: CV DIAGNOSITCS | Facility: HOSPITAL | Age: 26
End: 2021-03-31
Payer: COMMERCIAL

## 2021-03-31 ENCOUNTER — OUTPATIENT (OUTPATIENT)
Dept: OUTPATIENT SERVICES | Facility: HOSPITAL | Age: 26
LOS: 1 days | End: 2021-03-31

## 2021-03-31 DIAGNOSIS — R79.89 OTHER SPECIFIED ABNORMAL FINDINGS OF BLOOD CHEMISTRY: ICD-10-CM

## 2021-03-31 PROCEDURE — 93306 TTE W/DOPPLER COMPLETE: CPT | Mod: 26

## 2021-04-01 ENCOUNTER — TRANSCRIPTION ENCOUNTER (OUTPATIENT)
Age: 26
End: 2021-04-01

## 2021-04-05 ENCOUNTER — APPOINTMENT (OUTPATIENT)
Dept: INTERNAL MEDICINE | Facility: CLINIC | Age: 26
End: 2021-04-05

## 2021-04-05 NOTE — HISTORY OF PRESENT ILLNESS
[FreeTextEntry1] : Ms. TR MALONE is a  25 year old female with no significant PMH presents for follow up visit for new macroprolactinoma.  Diagnosed in Oct 2019.  \par \par Patient reports first started to have symptoms when started to have irregular periods in 2016. Saw PCP in 2018. Had hormone levels checked at that time and reports prolactin was 800s at that time. Reports was told likely 2/2 possible PCOS and was being monitored. \par \par Since then saw OBGYN and prolactin was rechecked and was 2600s. 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 Reports can skip a month every 3-4 months. Spotting in between. Menarche 12. No prior history of pregnancy.   \par \par She reports feeling well, she reported that her menses had restarted since February 2020.  She has been getting monthly menstrual, about every 21 days.  \par \par MRI done in 2020, heterogenous area of enhancement is seen, has diminished since last imaging, 1.9x1.2 cm and previously measures approximately 2.3x1.9cm. She is currently on Cabergoline 0.5mg 3 tablets twice weekly (1.5mg twice weekly). \par \par \par \par \par \par

## 2021-04-05 NOTE — ASSESSMENT
[FreeTextEntry1] : Ms. TR MALONE is 24 year old here for follow up prolactinoma. \par \par #1 Prolactinoma\par Continue with cabergoline 1.5 mg twice weekly.  Patient has been tolerating the medication well.\par Will send patient for cardiac echo given intermittent palpitation, to rule out any valvular abnormality, also would like to consider increasing Cabergoline dosing. \par Repeat prolactin level, check LH, FSH and estradiol level as well.\par LMP March 11, 2021. \par \par #2 Elevated DHEAS level\par May be secondary to elevated prolactin, we will repeat DHEAS level again today.  It was normal in January 2020.\par \par Endocrine follow-up in 4 months.\par

## 2021-04-05 NOTE — DATA REVIEWED
[FreeTextEntry1] : \par  MR Head w/wo IV Cont             Final\par \par No Documents Attached\par \par \par \par \par   EXAM:  MR BRAIN WAW IC\par \par \par PROCEDURE DATE:  06/19/2020\par \par \par \par INTERPRETATION:  Clinical indication: Pituitary macroadenoma.\par \par MRI of the sella turcica is performed using coronal and sagittal T1 and T2-weighted sequence. The patient was injected with approximately 6 cc of gadolinium IV with 1.5 cc of contrast discarded. Coronal and sagittal T1-weighted sequences were performed through the sella turcica. Dynamic coronal T1-weighted sequence was performed through the sella turcica. Axial T1-weighted sequence was performed through the brain.\par \par This exam is compared with prior contrast enhanced MRI of the sella turcica performed on October 18, 2019.\par \par Expanding lesion involving the pituitary gland is again seen. This involves the bilateral pituitary gland and is slightly more prominent on left side. Heterogeneous area of enhancement is seen though overall this enhancement has diminished when compared to the rest of the normal pituitary gland. This lesion measures approximately 1.9 x 1.2 cm and previously measured approximately 2.3 x 1.9 cm. Encasement of the left internal carotid artery is again suspected. Deviation of the pituitary stalk to the right side is again seen. The pituitary stalk appears normal in size.\par \par Evaluation of the adjacent optic chiasm appears normal.\par \par Limited evaluation of the brain parenchyma demonstrates no acute hemorrhage mass, mass effect or abnormal enhancement.\par \par IMPRESSION: Previously noted pituitary macroadenoma appears to have decreased in size when compared to the prior study.\par \par

## 2021-04-09 ENCOUNTER — TRANSCRIPTION ENCOUNTER (OUTPATIENT)
Age: 26
End: 2021-04-09

## 2021-04-12 ENCOUNTER — TRANSCRIPTION ENCOUNTER (OUTPATIENT)
Age: 26
End: 2021-04-12

## 2021-04-12 LAB
DHEA-SULFATE, SERUM: 405 UG/DL
ESTRADIOL SERPL-MCNC: 90 PG/ML
FSH SERPL-MCNC: 12.6 IU/L
LH SERPL-ACNC: 29.4 IU/L
PROLACTIN SERPL-MCNC: 152 NG/ML
TSH SERPL-ACNC: 0.79 UIU/ML

## 2021-04-14 ENCOUNTER — TRANSCRIPTION ENCOUNTER (OUTPATIENT)
Age: 26
End: 2021-04-14

## 2021-04-15 ENCOUNTER — TRANSCRIPTION ENCOUNTER (OUTPATIENT)
Age: 26
End: 2021-04-15

## 2021-05-20 ENCOUNTER — TRANSCRIPTION ENCOUNTER (OUTPATIENT)
Age: 26
End: 2021-05-20

## 2021-05-27 ENCOUNTER — TRANSCRIPTION ENCOUNTER (OUTPATIENT)
Age: 26
End: 2021-05-27

## 2021-05-27 LAB
DHEA-SULFATE, SERUM: 307 UG/DL
MONOMERIC PROLACTIN (ICMA)*: 81.3 NG/ML
PERCENT MACROPROLACTIN: 29 %
PROLACTIN SERPL-MCNC: 116 NG/ML
PROLACTIN, SERUM (ICMA)*: 115 NG/ML

## 2021-06-09 ENCOUNTER — TRANSCRIPTION ENCOUNTER (OUTPATIENT)
Age: 26
End: 2021-06-09

## 2021-08-25 LAB — PROLACTIN SERPL-MCNC: 109 NG/ML

## 2021-09-22 ENCOUNTER — APPOINTMENT (OUTPATIENT)
Dept: SPINE | Facility: CLINIC | Age: 26
End: 2021-09-22
Payer: COMMERCIAL

## 2021-09-22 ENCOUNTER — APPOINTMENT (OUTPATIENT)
Dept: ENDOCRINOLOGY | Facility: CLINIC | Age: 26
End: 2021-09-22

## 2021-09-22 VITALS
HEIGHT: 65 IN | SYSTOLIC BLOOD PRESSURE: 107 MMHG | WEIGHT: 130 LBS | HEART RATE: 67 BPM | BODY MASS INDEX: 21.66 KG/M2 | DIASTOLIC BLOOD PRESSURE: 76 MMHG | OXYGEN SATURATION: 95 %

## 2021-09-22 PROCEDURE — 99214 OFFICE O/P EST MOD 30 MIN: CPT

## 2021-09-22 RX ORDER — MELOXICAM 7.5 MG/1
7.5 TABLET ORAL
Qty: 60 | Refills: 3 | Status: DISCONTINUED | COMMUNITY
Start: 2020-05-07 | End: 2021-09-22

## 2021-09-22 RX ORDER — KETOCONAZOLE 20.5 MG/ML
2 SHAMPOO, SUSPENSION TOPICAL DAILY
Qty: 1 | Refills: 3 | Status: DISCONTINUED | COMMUNITY
Start: 2019-10-09 | End: 2021-09-22

## 2021-09-22 RX ORDER — KETOCONAZOLE 20.5 MG/ML
2 SHAMPOO, SUSPENSION TOPICAL
Qty: 1 | Refills: 0 | Status: DISCONTINUED | COMMUNITY
Start: 2019-10-13 | End: 2021-09-22

## 2021-09-22 NOTE — ASSESSMENT
[FreeTextEntry1] : \par \par 25 year old female with known pituitary tumor and prolactin level  refractory to hormone replacement.  Surgery for a transendoscopic transphenoidal resection was discussed.  A updated  MRI of the brain for pituitary tumor evaluation was ordered.   Surgery was recommended.  She will have the MRI  and return to the  office for review.

## 2021-09-22 NOTE — PHYSICAL EXAM
[General Appearance - Alert] : alert [FreeTextEntry1] : Facial acromegaly  [General Appearance - In No Acute Distress] : in no acute distress [Oriented To Time, Place, And Person] : oriented to person, place, and time [Impaired Insight] : insight and judgment were intact [Affect] : the affect was normal [Person] : oriented to person [Place] : oriented to place [Time] : oriented to time [Short Term Intact] : short term memory intact [Remote Intact] : remote memory intact [Span Intact] : the attention span was normal [Concentration Intact] : normal concentrating ability [Fluency] : fluency intact [Comprehension] : comprehension intact [Current Events] : adequate knowledge of current events [Past History] : adequate knowledge of personal past history [Vocabulary] : adequate range of vocabulary [Cranial Nerves Optic (II)] : visual acuity intact bilaterally,  pupils equal round and reactive to light [Cranial Nerves Oculomotor (III)] : extraocular motion intact [Cranial Nerves Facial (VII)] : face symmetrical [Cranial Nerves Vestibulocochlear (VIII)] : hearing was intact bilaterally [Cranial Nerves Accessory (XI - Cranial And Spinal)] : head turning and shoulder shrug symmetric [Cranial Nerves Hypoglossal (XII)] : there was no tongue deviation with protrusion [Motor Tone] : muscle tone was normal in all four extremities [Motor Strength] : muscle strength was normal in all four extremities [No Muscle Atrophy] : normal bulk in all four extremities [Sensation Tactile Decrease] : light touch was intact [Balance] : balance was intact [Past-pointing] : there was no past-pointing [Tremor] : no tremor present [2+] : Patella left 2+ [No Visual Abnormalities] : no visible abnormailities [Full ROM] : full ROM [Normal] : normal [Intact] : all motor groups within normal limits of strength and tone bilaterally [Sclera] : the sclera and conjunctiva were normal [PERRL With Normal Accommodation] : pupils were equal in size, round, reactive to light, with normal accommodation [Extraocular Movements] : extraocular movements were intact [Outer Ear] : the ears and nose were normal in appearance [Hearing Threshold Finger Rub Not [place]] : hearing was normal [Neck Appearance] : the appearance of the neck was normal [] : no respiratory distress [Abnormal Walk] : normal gait [Skin Color & Pigmentation] : normal skin color and pigmentation

## 2021-09-22 NOTE — DATA REVIEWED
[de-identified] : Brain MRI from [facility]  6/19/2020 and prior Brain  MRI  from October 2019 in [facility]

## 2021-09-22 NOTE — HISTORY OF PRESENT ILLNESS
[> 3 months] : more  than 3 months [FreeTextEntry1] : Pituitary tumor  [de-identified] : Milka toribio  a 25 year old female  who had amenorrhea in October 2019 and had undergone lab work revealing a high prolactin.  The prolactin level was 2579 in October 2019 and a brain MRI  was ordered revealing a pituitary tumor and placed on Cabergoline two times a week.  She has been followed by Endocrinology over the last years.  Presently she has a prolactin level at 109 and remains on Cabergoline.  .  She has seen a optho and  reported as "normal".  She denies any other neurologic symptoms.   She does have regular menses at this time.    No galactorrhea.  She does report that in 2018 a high prolactin level was noted on routine labs but not treated.  She has been titrating the dose of Cabergoline and surgery is being considered.

## 2021-09-22 NOTE — REASON FOR VISIT
[New Patient Visit] : a new patient visit [Other: _____] : [unfilled] [Referred By: _________] : Patient was referred by WILLA [FreeTextEntry1] : Microadenoma

## 2021-10-27 ENCOUNTER — OUTPATIENT (OUTPATIENT)
Dept: OUTPATIENT SERVICES | Facility: HOSPITAL | Age: 26
LOS: 1 days | End: 2021-10-27
Payer: COMMERCIAL

## 2021-10-27 ENCOUNTER — APPOINTMENT (OUTPATIENT)
Dept: MRI IMAGING | Facility: IMAGING CENTER | Age: 26
End: 2021-10-27
Payer: COMMERCIAL

## 2021-10-27 DIAGNOSIS — D35.2 BENIGN NEOPLASM OF PITUITARY GLAND: ICD-10-CM

## 2021-10-27 DIAGNOSIS — Z00.8 ENCOUNTER FOR OTHER GENERAL EXAMINATION: ICD-10-CM

## 2021-10-27 PROCEDURE — A9585: CPT

## 2021-10-27 PROCEDURE — 70553 MRI BRAIN STEM W/O & W/DYE: CPT

## 2021-10-27 PROCEDURE — 70553 MRI BRAIN STEM W/O & W/DYE: CPT | Mod: 26

## 2021-11-02 ENCOUNTER — APPOINTMENT (OUTPATIENT)
Dept: OBGYN | Facility: CLINIC | Age: 26
End: 2021-11-02
Payer: COMMERCIAL

## 2021-11-02 ENCOUNTER — APPOINTMENT (OUTPATIENT)
Dept: INTERNAL MEDICINE | Facility: CLINIC | Age: 26
End: 2021-11-02
Payer: COMMERCIAL

## 2021-11-02 VITALS
BODY MASS INDEX: 21.66 KG/M2 | DIASTOLIC BLOOD PRESSURE: 60 MMHG | WEIGHT: 130 LBS | HEIGHT: 65 IN | SYSTOLIC BLOOD PRESSURE: 100 MMHG

## 2021-11-02 VITALS
HEIGHT: 65 IN | OXYGEN SATURATION: 99 % | HEART RATE: 70 BPM | WEIGHT: 130 LBS | DIASTOLIC BLOOD PRESSURE: 60 MMHG | SYSTOLIC BLOOD PRESSURE: 100 MMHG | BODY MASS INDEX: 21.66 KG/M2

## 2021-11-02 DIAGNOSIS — Z01.419 ENCOUNTER FOR GYNECOLOGICAL EXAMINATION (GENERAL) (ROUTINE) W/OUT ABNORMAL FINDINGS: ICD-10-CM

## 2021-11-02 DIAGNOSIS — Z11.3 ENCOUNTER FOR SCREENING FOR INFECTIONS WITH A PREDOMINANTLY SEXUAL MODE OF TRANSMISSION: ICD-10-CM

## 2021-11-02 DIAGNOSIS — E55.9 VITAMIN D DEFICIENCY, UNSPECIFIED: ICD-10-CM

## 2021-11-02 PROCEDURE — G0442 ANNUAL ALCOHOL SCREEN 15 MIN: CPT | Mod: NC

## 2021-11-02 PROCEDURE — G0444 DEPRESSION SCREEN ANNUAL: CPT | Mod: NC,59

## 2021-11-02 PROCEDURE — 99395 PREV VISIT EST AGE 18-39: CPT

## 2021-11-02 NOTE — HEALTH RISK ASSESSMENT
[No] : No [No falls in past year] : Patient reported no falls in the past year [0] : 2) Feeling down, depressed, or hopeless: Not at all (0) [PHQ-2 Negative - No further assessment needed] : PHQ-2 Negative - No further assessment needed [] : No [Audit-CScore] : 0 [[id number]] : 0

## 2021-11-02 NOTE — HISTORY OF PRESENT ILLNESS
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

## 2021-11-02 NOTE — PLAN
[FreeTextEntry1] :  TR MALONE 26 year old female  hx hyperprolactemia, macroprolactinoma\par \par Routine Gyn Exam:\par BSE taught\par Pap/HPV conducted\par \par Rx TVS\par Rx DEXA due to hx of hyperprolactemia\par Ca/Vit D 1000mcg, nutrition, 30 min weight bearing exercise discussed with patient\par f/u with Neurosurg for macroadenoma and endocrine \par RTO in 1 year or

## 2021-11-02 NOTE — HISTORY OF PRESENT ILLNESS
[FreeTextEntry1] : 2021. 26 year old female  LMP 10/22/21, PMH macroprolactinoma. She presents for annual gyn exam and offers no complaints. Regular menses. Denies breakthrough bleeding, abnormal vaginal discharge.  She reports no urinary or bowel complaints or blood stools.\par \par Pt hx hyperprolactinema and macroadenoma and MRI brain showed slight decrease but prolactin levels still high at >100 on 2mg BIW of dostinex and endocrine cannot lower the prolactin despite increasing medication.  Pt denies HA or blurry vision or galactorrhea.\par \par Reviewed last pap smear \par \par Reviewed patient's current medications and allergies\par \par Denies changes in medical status, medications, serious illness, hospitalizations and surgeries\par \par No Fam hx of Breast , Colon, Uterine or Ovarian Ca\par \par +sexually active using condoms

## 2021-11-02 NOTE — PHYSICAL EXAM
[Well Nourished] : well nourished [Well Developed] : well developed [Well-Appearing] : well-appearing [Normal Sclera/Conjunctiva] : normal sclera/conjunctiva [PERRL] : pupils equal round and reactive to light [EOMI] : extraocular movements intact [Normal Outer Ear/Nose] : the outer ears and nose were normal in appearance [Normal Oropharynx] : the oropharynx was normal [No JVD] : no jugular venous distention [No Lymphadenopathy] : no lymphadenopathy [Supple] : supple [Thyroid Normal, No Nodules] : the thyroid was normal and there were no nodules present [No Respiratory Distress] : no respiratory distress  [No Accessory Muscle Use] : no accessory muscle use [Clear to Auscultation] : lungs were clear to auscultation bilaterally [Normal Rate] : normal rate  [Regular Rhythm] : with a regular rhythm [Normal S1, S2] : normal S1 and S2 [No Murmur] : no murmur heard [No Carotid Bruits] : no carotid bruits [No Abdominal Bruit] : a ~M bruit was not heard ~T in the abdomen [No Varicosities] : no varicosities [Pedal Pulses Present] : the pedal pulses are present [No Edema] : there was no peripheral edema [No Palpable Aorta] : no palpable aorta [No Extremity Clubbing/Cyanosis] : no extremity clubbing/cyanosis [Normal Appearance] : normal in appearance [No Nipple Discharge] : no nipple discharge [No Axillary Lymphadenopathy] : no axillary lymphadenopathy [Soft] : abdomen soft [Non Tender] : non-tender [Non-distended] : non-distended [No Masses] : no abdominal mass palpated [No HSM] : no HSM [Normal Bowel Sounds] : normal bowel sounds [Normal] : soft, non-tender, non-distended, no masses palpated, no HSM and normal bowel sounds [Normal Posterior Cervical Nodes] : no posterior cervical lymphadenopathy [Normal Anterior Cervical Nodes] : no anterior cervical lymphadenopathy [No CVA Tenderness] : no CVA  tenderness [No Spinal Tenderness] : no spinal tenderness [No Joint Swelling] : no joint swelling [Grossly Normal Strength/Tone] : grossly normal strength/tone [No Rash] : no rash [Coordination Grossly Intact] : coordination grossly intact [No Focal Deficits] : no focal deficits [Normal Gait] : normal gait [Normal Affect] : the affect was normal [Normal Insight/Judgement] : insight and judgment were intact

## 2021-11-03 ENCOUNTER — APPOINTMENT (OUTPATIENT)
Dept: SPINE | Facility: CLINIC | Age: 26
End: 2021-11-03
Payer: COMMERCIAL

## 2021-11-03 VITALS
WEIGHT: 130 LBS | HEIGHT: 65 IN | SYSTOLIC BLOOD PRESSURE: 110 MMHG | BODY MASS INDEX: 21.66 KG/M2 | OXYGEN SATURATION: 100 % | HEART RATE: 64 BPM | DIASTOLIC BLOOD PRESSURE: 79 MMHG

## 2021-11-03 LAB
C TRACH RRNA SPEC QL NAA+PROBE: NOT DETECTED
N GONORRHOEA RRNA SPEC QL NAA+PROBE: NOT DETECTED
SOURCE AMPLIFICATION: NORMAL

## 2021-11-03 PROCEDURE — 99213 OFFICE O/P EST LOW 20 MIN: CPT

## 2021-11-07 LAB — CYTOLOGY CVX/VAG DOC THIN PREP: NORMAL

## 2021-11-18 ENCOUNTER — OUTPATIENT (OUTPATIENT)
Dept: OUTPATIENT SERVICES | Facility: HOSPITAL | Age: 26
LOS: 1 days | End: 2021-11-18
Payer: COMMERCIAL

## 2021-11-18 VITALS
HEIGHT: 65 IN | HEART RATE: 73 BPM | OXYGEN SATURATION: 100 % | WEIGHT: 128.09 LBS | SYSTOLIC BLOOD PRESSURE: 113 MMHG | RESPIRATION RATE: 16 BRPM | TEMPERATURE: 98 F | DIASTOLIC BLOOD PRESSURE: 77 MMHG

## 2021-11-18 DIAGNOSIS — Z29.9 ENCOUNTER FOR PROPHYLACTIC MEASURES, UNSPECIFIED: ICD-10-CM

## 2021-11-18 DIAGNOSIS — D35.2 BENIGN NEOPLASM OF PITUITARY GLAND: ICD-10-CM

## 2021-11-18 DIAGNOSIS — Z01.818 ENCOUNTER FOR OTHER PREPROCEDURAL EXAMINATION: ICD-10-CM

## 2021-11-18 LAB
ANION GAP SERPL CALC-SCNC: 13 MMOL/L — SIGNIFICANT CHANGE UP (ref 5–17)
BUN SERPL-MCNC: 16 MG/DL — SIGNIFICANT CHANGE UP (ref 7–23)
CALCIUM SERPL-MCNC: 9.4 MG/DL — SIGNIFICANT CHANGE UP (ref 8.4–10.5)
CHLORIDE SERPL-SCNC: 103 MMOL/L — SIGNIFICANT CHANGE UP (ref 96–108)
CO2 SERPL-SCNC: 21 MMOL/L — LOW (ref 22–31)
CREAT SERPL-MCNC: 0.75 MG/DL — SIGNIFICANT CHANGE UP (ref 0.5–1.3)
GLUCOSE SERPL-MCNC: 87 MG/DL — SIGNIFICANT CHANGE UP (ref 70–99)
HCT VFR BLD CALC: 37.7 % — SIGNIFICANT CHANGE UP (ref 34.5–45)
HGB BLD-MCNC: 12.3 G/DL — SIGNIFICANT CHANGE UP (ref 11.5–15.5)
MCHC RBC-ENTMCNC: 29.3 PG — SIGNIFICANT CHANGE UP (ref 27–34)
MCHC RBC-ENTMCNC: 32.6 GM/DL — SIGNIFICANT CHANGE UP (ref 32–36)
MCV RBC AUTO: 89.8 FL — SIGNIFICANT CHANGE UP (ref 80–100)
NRBC # BLD: 0 /100 WBCS — SIGNIFICANT CHANGE UP (ref 0–0)
PLATELET # BLD AUTO: 250 K/UL — SIGNIFICANT CHANGE UP (ref 150–400)
POTASSIUM SERPL-MCNC: 3.9 MMOL/L — SIGNIFICANT CHANGE UP (ref 3.5–5.3)
POTASSIUM SERPL-SCNC: 3.9 MMOL/L — SIGNIFICANT CHANGE UP (ref 3.5–5.3)
RBC # BLD: 4.2 M/UL — SIGNIFICANT CHANGE UP (ref 3.8–5.2)
RBC # FLD: 13 % — SIGNIFICANT CHANGE UP (ref 10.3–14.5)
SODIUM SERPL-SCNC: 137 MMOL/L — SIGNIFICANT CHANGE UP (ref 135–145)
WBC # BLD: 8.35 K/UL — SIGNIFICANT CHANGE UP (ref 3.8–10.5)
WBC # FLD AUTO: 8.35 K/UL — SIGNIFICANT CHANGE UP (ref 3.8–10.5)

## 2021-11-18 PROCEDURE — 86900 BLOOD TYPING SEROLOGIC ABO: CPT

## 2021-11-18 PROCEDURE — 85027 COMPLETE CBC AUTOMATED: CPT

## 2021-11-18 PROCEDURE — 86901 BLOOD TYPING SEROLOGIC RH(D): CPT

## 2021-11-18 PROCEDURE — G0463: CPT

## 2021-11-18 PROCEDURE — 80048 BASIC METABOLIC PNL TOTAL CA: CPT

## 2021-11-18 PROCEDURE — 86850 RBC ANTIBODY SCREEN: CPT

## 2021-11-18 RX ORDER — CEFAZOLIN SODIUM 1 G
2000 VIAL (EA) INJECTION ONCE
Refills: 0 | Status: DISCONTINUED | OUTPATIENT
Start: 2021-12-02 | End: 2021-12-04

## 2021-11-18 RX ORDER — CHLORHEXIDINE GLUCONATE 213 G/1000ML
1 SOLUTION TOPICAL ONCE
Refills: 0 | Status: DISCONTINUED | OUTPATIENT
Start: 2021-12-02 | End: 2021-12-04

## 2021-11-18 NOTE — H&P PST ADULT - HISTORY OF PRESENT ILLNESS
LMP 10/22/21  amenorrhea 2018 x 6 months with elevated prolactin levels    Covid PCR test scheduled for 11/29/21 at [facility]  Denies Recent travel, Exposure or Covid symptoms   Covid vaccine Pfizer 2nd dose received 1/30/2021; Booster received 10/2021 26 year old female with PMH vitamin D deficiency reports c/o amenorrhea x 6 months back in October 2019 with blood work that revealed an elevated prolactin level s/p brain MRI revealed a pituitary tumor and pt was placed on Cabergoline medication. Pt denies any other neurologic symptoms and reports that her menstrual cycle has returned as a regular cycle since starting the medication with LMP 10/22/21. Pt now presents to [facility] for scheduled endoscopic transphenoidal removal of a pituitary tumor on 12/2/21 with Dr. Murillo.    Covid PCR test scheduled for 11/29/21 at [facility]  Denies Recent travel, Exposure or Covid symptoms   Covid vaccine Pfizer 2nd dose received 1/30/2021; Booster received 10/2021 26 year old female with PMH vitamin D deficiency and heart palpitations (Dx in 2018 s/p echocardiogram WNL) reports c/o amenorrhea x 6 months back in October 2019 with blood work that revealed an elevated prolactin level s/p brain MRI revealed a pituitary tumor and pt was placed on Cabergoline medication. Pt denies any other neurologic symptoms and reports that her menstrual cycle has returned as a regular cycle since starting the medication with LMP 10/22/21. Pt now presents to [facility] for scheduled endoscopic transphenoidal removal of a pituitary tumor on 12/2/21 with Dr. Murillo.    Covid PCR test scheduled for 11/29/21 at [facility]  Denies Recent travel, Exposure or Covid symptoms   Covid vaccine Pfizer 2nd dose received 1/30/2021; Booster received 10/2021

## 2021-11-18 NOTE — H&P PST ADULT - HEALTH CARE MAINTENANCE
Covid vaccine Pfizer 2nd dose received 1/30/2021; Booster received 10/2021  Received the Flu vaccine 2021

## 2021-11-18 NOTE — H&P PST ADULT - ASSESSMENT
ALEXI VTE 2.0 SCORE [CLOT updated 2019]    AGE RELATED RISK FACTORS                                                       MOBILITY RELATED FACTORS  [ ] Age 41-60 years                                            (1 Point)                    [ ] Bed rest                                                        (1 Point)  [ ] Age: 61-74 years                                           (2 Points)                  [ ] Plaster cast                                                   (2 Points)  [ ] Age= 75 years                                              (3 Points)                    [ ] Bed bound for more than 72 hours                 (2 Points)    DISEASE RELATED RISK FACTORS                                               GENDER SPECIFIC FACTORS  [ ] Edema in the lower extremities                       (1 Point)              [ ] Pregnancy                                                     (1 Point)  [ ] Varicose veins                                               (1 Point)                     [ ] Post-partum < 6 weeks                                   (1 Point)             [ ] BMI > 25 Kg/m2                                            (1 Point)                     [ ] Hormonal therapy  or oral contraception          (1 Point)                 [ ] Sepsis (in the previous month)                        (1 Point)               [ ] History of pregnancy complications                 (1 point)  [ ] Pneumonia or serious lung disease                                               [ ] Unexplained or recurrent                     (1 Point)           (in the previous month)                               (1 Point)  [ ] Abnormal pulmonary function test                     (1 Point)                 SURGERY RELATED RISK FACTORS  [ ] Acute myocardial infarction                              (1 Point)               [ ]  Section                                             (1 Point)  [ ] Congestive heart failure (in the previous month)  (1 Point)      [ ] Minor surgery                                                  (1 Point)   [ ] Inflammatory bowel disease                             (1 Point)               [ ] Arthroscopic surgery                                        (2 Points)  [ ] Central venous access                                      (2 Points)                [X ] General surgery lasting more than 45 minutes (2 points)  [ ] Malignancy- Present or previous                   (2 Points)                [ ] Elective arthroplasty                                         (5 points)    [ ] Stroke (in the previous month)                          (5 Points)                                                                                                                                                           HEMATOLOGY RELATED FACTORS                                                 TRAUMA RELATED RISK FACTORS  [ ] Prior episodes of VTE                                     (3 Points)                [ ] Fracture of the hip, pelvis, or leg                       (5 Points)  [ ] Positive family history for VTE                         (3 Points)             [ ] Acute spinal cord injury (in the previous month)  (5 Points)  [ ] Prothrombin 50922 A                                     (3 Points)               [ ] Paralysis  (less than 1 month)                             (5 Points)  [ ] Factor V Leiden                                             (3 Points)                  [ ] Multiple Trauma within 1 month                        (5 Points)  [ ] Lupus anticoagulants                                     (3 Points)                                                           [ ] Anticardiolipin antibodies                               (3 Points)                                                       [ ] High homocysteine in the blood                      (3 Points)                                             [ ] Other congenital or acquired thrombophilia      (3 Points)                                                [ ] Heparin induced thrombocytopenia                  (3 Points)                                     Total Score [   2       ]

## 2021-11-18 NOTE — H&P PST ADULT - ACTIVITY
Able to walk 2-3 blocks, 1 Flight of stairs, ADLs, Grocery Shopping, Hiking on spare time for 3 hours

## 2021-11-18 NOTE — H&P PST ADULT - REASON FOR ADMISSION
"I am having endoscopic transphenoidal removal of pituitary tumor resection." "I am having endoscopic transphenoidal removal of a pituitary tumor."

## 2021-11-18 NOTE — H&P PST ADULT - RS GEN PE MLT RESP DETAILS PC
None normal/airway patent/breath sounds equal/good air movement/respirations non-labored/clear to auscultation bilaterally

## 2021-11-18 NOTE — H&P PST ADULT - PROBLEM SELECTOR PLAN 1
Pt is scheduled for an Endoscopic Transphenoidal Removal of a Pituitary Tumor with Dr. Murillo on 12/2/21  Covid PCR test scheduled for 11/29/21 at [facility]  CBC, BMP, T/S ordered and obtained  Urine HCG, ABO on admit

## 2021-11-18 NOTE — H&P PST ADULT - NSICDXPASTMEDICALHX_GEN_ALL_CORE_FT
PAST MEDICAL HISTORY:  Chronic back pain disc protrusions between L4-L5; Improved with physical therapy and exercises    Heart palpitations since 2018; echocardiogram ordered by endocrinologist - as per patient, results were normal    History of pituitary tumor Benign    Vitamin D deficiency

## 2021-11-19 ENCOUNTER — APPOINTMENT (OUTPATIENT)
Dept: OTOLARYNGOLOGY | Facility: CLINIC | Age: 26
End: 2021-11-19
Payer: COMMERCIAL

## 2021-11-19 ENCOUNTER — TRANSCRIPTION ENCOUNTER (OUTPATIENT)
Age: 26
End: 2021-11-19

## 2021-11-19 VITALS — TEMPERATURE: 97.9 F | SYSTOLIC BLOOD PRESSURE: 103 MMHG | DIASTOLIC BLOOD PRESSURE: 63 MMHG | HEART RATE: 83 BPM

## 2021-11-19 DIAGNOSIS — Z78.9 OTHER SPECIFIED HEALTH STATUS: ICD-10-CM

## 2021-11-19 PROCEDURE — 99204 OFFICE O/P NEW MOD 45 MIN: CPT | Mod: 25

## 2021-11-19 PROCEDURE — 31231 NASAL ENDOSCOPY DX: CPT

## 2021-11-19 RX ORDER — UBIDECARENONE/VIT E ACET 100MG-5
CAPSULE ORAL
Refills: 0 | Status: ACTIVE | COMMUNITY

## 2021-11-19 NOTE — REASON FOR VISIT
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

## 2021-11-19 NOTE — CONSULT LETTER
[Dear  ___] : Dear  [unfilled], [Courtesy Letter:] : I had the pleasure of seeing your patient, [unfilled], in my office today. [Please see my note below.] : Please see my note below. [Sincerely,] : Sincerely, [FreeTextEntry2] : Dr Murillo [FreeTextEntry3] : Vidhi Marie MD\par Otolaryngology and Cranial Base Surgery\par Attending Physician - Department of Otolaryngology and Head & Neck Surgery  [DrKelly  ___] : Dr. CROUCH

## 2021-11-19 NOTE — HISTORY OF PRESENT ILLNESS
[de-identified] : 26 year old female referred by Dr Murillo for pituitary macroadenoma. States did not have her menses for 6 months followed up with gynecologist and MRI was ordered.  Currently asymptomatic however she is on high doses of cabergoline and her endocrinologist recommended adenoma resection. Occasional nasal congestion. No hx nasal trauma/surgery. Sense of smell/taste are good.\par \par MR Pituitary w/ and w/o contrast  10/27/21\par IMPRESSION:\par Left-sided pituitary macroadenoma is slightly smaller and is slightly different signal intensity compared with 6/19/2020. The left portion of the gland demonstrates bright signal intensity on the T1-weighted images which may be due to increased protein or hemorrhagic products. There is no chiasm compression.\par

## 2021-11-19 NOTE — ASSESSMENT
[FreeTextEntry1] : Pituitary Macroadenoma:\par - discussed my role in the surgery including the nasal and sinus procedures that I will be performing in order to provide the neurosurgeon access to the pituitary tumor\par - expected post-op hospital course discussed including need for possible repair of spinal fluid leak and close observation for 2-3 days\par - post-op care consisting of nasal saline irrigations was reviewed, and [facility] sinus pamphlet was given to patient to obtain prior to surgery so they have ready to use on discharge home\par - discussed post-operative issues including nasal congestion, loss of sense of smell which should be temporary but can be permanent, bloody nasal drainage, facial pressure/headaches, bleeding, and infection\par - all questions answered and patient will call if any further concerns\par - plan to see patient on AM of surgery

## 2021-11-22 ENCOUNTER — TRANSCRIPTION ENCOUNTER (OUTPATIENT)
Age: 26
End: 2021-11-22

## 2021-11-22 PROBLEM — R00.2 PALPITATIONS: Chronic | Status: ACTIVE | Noted: 2021-11-18

## 2021-11-22 PROBLEM — E55.9 VITAMIN D DEFICIENCY, UNSPECIFIED: Chronic | Status: ACTIVE | Noted: 2021-11-18

## 2021-11-22 PROBLEM — Z87.898 PERSONAL HISTORY OF OTHER SPECIFIED CONDITIONS: Chronic | Status: ACTIVE | Noted: 2021-11-18

## 2021-11-22 PROBLEM — M54.9 DORSALGIA, UNSPECIFIED: Chronic | Status: ACTIVE | Noted: 2021-11-18

## 2021-11-24 ENCOUNTER — APPOINTMENT (OUTPATIENT)
Dept: INTERNAL MEDICINE | Facility: CLINIC | Age: 26
End: 2021-11-24
Payer: COMMERCIAL

## 2021-11-24 VITALS
BODY MASS INDEX: 21.99 KG/M2 | SYSTOLIC BLOOD PRESSURE: 100 MMHG | HEART RATE: 66 BPM | OXYGEN SATURATION: 98 % | WEIGHT: 132 LBS | DIASTOLIC BLOOD PRESSURE: 60 MMHG | HEIGHT: 65 IN

## 2021-11-24 PROCEDURE — 99214 OFFICE O/P EST MOD 30 MIN: CPT

## 2021-11-28 LAB
25(OH)D3 SERPL-MCNC: 35.9 NG/ML
ALBUMIN SERPL ELPH-MCNC: 4.8 G/DL
ALP BLD-CCNC: 66 U/L
ALT SERPL-CCNC: 11 U/L
ANION GAP SERPL CALC-SCNC: 13 MMOL/L
AST SERPL-CCNC: 28 U/L
BASOPHILS # BLD AUTO: 0.05 K/UL
BASOPHILS NFR BLD AUTO: 1 %
BILIRUB SERPL-MCNC: 0.5 MG/DL
BUN SERPL-MCNC: 11 MG/DL
CALCIUM SERPL-MCNC: 9.7 MG/DL
CHLORIDE SERPL-SCNC: 106 MMOL/L
CO2 SERPL-SCNC: 22 MMOL/L
CREAT SERPL-MCNC: 0.65 MG/DL
DHEA-SULFATE, SERUM: 251 UG/DL
EOSINOPHIL # BLD AUTO: 0.11 K/UL
EOSINOPHIL NFR BLD AUTO: 2.3 %
ESTIMATED AVERAGE GLUCOSE: 105 MG/DL
GLUCOSE SERPL-MCNC: 91 MG/DL
HBA1C MFR BLD HPLC: 5.3 %
HBV CORE IGG+IGM SER QL: NONREACTIVE
HBV SURFACE AB SER QL: REACTIVE
HBV SURFACE AG SER QL: NONREACTIVE
HCT VFR BLD CALC: 39 %
HCV AB SER QL: NONREACTIVE
HCV S/CO RATIO: 0.12 S/CO
HGB BLD-MCNC: 12.3 G/DL
HIV1+2 AB SPEC QL IA.RAPID: NONREACTIVE
IMM GRANULOCYTES NFR BLD AUTO: 0.2 %
LYMPHOCYTES # BLD AUTO: 2.08 K/UL
LYMPHOCYTES NFR BLD AUTO: 43.2 %
MAN DIFF?: NORMAL
MCHC RBC-ENTMCNC: 29.1 PG
MCHC RBC-ENTMCNC: 31.5 GM/DL
MCV RBC AUTO: 92.2 FL
MONOCYTES # BLD AUTO: 0.22 K/UL
MONOCYTES NFR BLD AUTO: 4.6 %
MONOMERIC PROLACTIN (ICMA)*: 66.3 NG/ML
NEUTROPHILS # BLD AUTO: 2.34 K/UL
NEUTROPHILS NFR BLD AUTO: 48.7 %
PERCENT MACROPROLACTIN: 23 %
PLATELET # BLD AUTO: 264 K/UL
POTASSIUM SERPL-SCNC: 4.3 MMOL/L
PROLACTIN SERPL-MCNC: 93.6 NG/ML
PROLACTIN, SERUM (ICMA)*: 85.6 NG/ML
PROT SERPL-MCNC: 7.6 G/DL
RBC # BLD: 4.23 M/UL
RBC # FLD: 13.1 %
SODIUM SERPL-SCNC: 141 MMOL/L
T PALLIDUM AB SER QL IA: NEGATIVE
TSH SERPL-ACNC: 1.35 UIU/ML
WBC # FLD AUTO: 4.81 K/UL

## 2021-11-28 NOTE — ASSESSMENT
[High Risk Surgery - Intraperitoneal, Intrathoracic or Supringuinal Vascular Procedures] : High Risk Surgery - Intraperitoneal, Intrathoracic or Supringuinal Vascular Procedures - No (0) [Ischemic Heart Disease] : Ischemic Heart Disease - No (0) [Congestive Heart Failure] : Congestive Heart Failure - No (0) [Prior Cerebrovascular Accident or TIA] : Prior Cerebrovascular Accident or TIA - No (0) [Creatinine >= 2mg/dL (1 Point)] : Creatinine >= 2mg/dL - No (0) [Insulin-dependent Diabetic (1 Point)] : Insulin-dependent Diabetic - No (0) [0] : 0 , RCRI Class: I, Risk of Post-Op Cardiac Complications: 3.9%, 95% CI for Risk Estimate: 2.8% - 5.4% [Patient Optimized for Surgery] : Patient optimized for surgery [No Further Testing Recommended] : no further testing recommended [Continue medications as is] : Continue current medications [FreeTextEntry7] : Cabergoline twice weekly, endocrine to round on patient when in hospital.

## 2021-11-28 NOTE — HISTORY OF PRESENT ILLNESS
[No Pertinent Cardiac History] : no history of aortic stenosis, atrial fibrillation, coronary artery disease, recent myocardial infarction, or implantable device/pacemaker [No Pertinent Pulmonary History] : no history of asthma, COPD, sleep apnea, or smoking [(Patient denies any chest pain, claudication, dyspnea on exertion, orthopnea, palpitations or syncope)] : Patient denies any chest pain, claudication, dyspnea on exertion, orthopnea, palpitations or syncope [Chronic Anticoagulation] : no chronic anticoagulation [Chronic Kidney Disease] : no chronic kidney disease [Diabetes] : no diabetes [FreeTextEntry1] : resection of pituitary macroademoa [FreeTextEntry2] : 12/2/2021 [FreeTextEntry3] : Dr Murillo [FreeTextEntry4] : 27 yo female hx pituitary macroadenoma with elevated prolactin , on high dose cabergoline to control prolactin level,  referred by her surgeon for preop clearance.\par LMP 1week ago\par

## 2021-11-29 ENCOUNTER — OUTPATIENT (OUTPATIENT)
Dept: OUTPATIENT SERVICES | Facility: HOSPITAL | Age: 26
LOS: 1 days | End: 2021-11-29
Payer: COMMERCIAL

## 2021-11-29 DIAGNOSIS — Z11.52 ENCOUNTER FOR SCREENING FOR COVID-19: ICD-10-CM

## 2021-11-29 LAB — SARS-COV-2 RNA SPEC QL NAA+PROBE: SIGNIFICANT CHANGE UP

## 2021-11-29 PROCEDURE — C9803: CPT

## 2021-11-29 PROCEDURE — U0005: CPT

## 2021-11-29 PROCEDURE — U0003: CPT

## 2021-12-01 ENCOUNTER — FORM ENCOUNTER (OUTPATIENT)
Age: 26
End: 2021-12-01

## 2021-12-01 ENCOUNTER — TRANSCRIPTION ENCOUNTER (OUTPATIENT)
Age: 26
End: 2021-12-01

## 2021-12-01 ENCOUNTER — OUTPATIENT (OUTPATIENT)
Dept: OUTPATIENT SERVICES | Facility: HOSPITAL | Age: 26
LOS: 1 days | End: 2021-12-01
Payer: COMMERCIAL

## 2021-12-01 ENCOUNTER — APPOINTMENT (OUTPATIENT)
Dept: CT IMAGING | Facility: IMAGING CENTER | Age: 26
End: 2021-12-01
Payer: COMMERCIAL

## 2021-12-01 DIAGNOSIS — D35.2 BENIGN NEOPLASM OF PITUITARY GLAND: ICD-10-CM

## 2021-12-01 DIAGNOSIS — Z00.8 ENCOUNTER FOR OTHER GENERAL EXAMINATION: ICD-10-CM

## 2021-12-01 PROCEDURE — 70450 CT HEAD/BRAIN W/O DYE: CPT

## 2021-12-01 PROCEDURE — 70450 CT HEAD/BRAIN W/O DYE: CPT | Mod: 26

## 2021-12-02 ENCOUNTER — APPOINTMENT (OUTPATIENT)
Dept: OTOLARYNGOLOGY | Facility: HOSPITAL | Age: 26
End: 2021-12-02

## 2021-12-02 ENCOUNTER — RESULT REVIEW (OUTPATIENT)
Age: 26
End: 2021-12-02

## 2021-12-02 ENCOUNTER — APPOINTMENT (OUTPATIENT)
Dept: CT IMAGING | Facility: HOSPITAL | Age: 26
End: 2021-12-02

## 2021-12-02 ENCOUNTER — INPATIENT (INPATIENT)
Facility: HOSPITAL | Age: 26
LOS: 1 days | Discharge: HOME | DRG: 615 | End: 2021-12-04
Attending: NEUROLOGICAL SURGERY | Admitting: NEUROLOGICAL SURGERY
Payer: COMMERCIAL

## 2021-12-02 ENCOUNTER — APPOINTMENT (OUTPATIENT)
Dept: SPINE | Facility: HOSPITAL | Age: 26
End: 2021-12-02

## 2021-12-02 VITALS
DIASTOLIC BLOOD PRESSURE: 70 MMHG | RESPIRATION RATE: 16 BRPM | TEMPERATURE: 98 F | OXYGEN SATURATION: 98 % | HEART RATE: 78 BPM | WEIGHT: 128.09 LBS | SYSTOLIC BLOOD PRESSURE: 102 MMHG | HEIGHT: 65 IN

## 2021-12-02 DIAGNOSIS — D35.2 BENIGN NEOPLASM OF PITUITARY GLAND: ICD-10-CM

## 2021-12-02 LAB
ANION GAP SERPL CALC-SCNC: 12 MMOL/L — SIGNIFICANT CHANGE UP (ref 5–17)
ANION GAP SERPL CALC-SCNC: 12 MMOL/L — SIGNIFICANT CHANGE UP (ref 5–17)
BUN SERPL-MCNC: 11 MG/DL — SIGNIFICANT CHANGE UP (ref 7–23)
BUN SERPL-MCNC: 13 MG/DL — SIGNIFICANT CHANGE UP (ref 7–23)
CALCIUM SERPL-MCNC: 7.9 MG/DL — LOW (ref 8.4–10.5)
CALCIUM SERPL-MCNC: 8.3 MG/DL — LOW (ref 8.4–10.5)
CHLORIDE SERPL-SCNC: 104 MMOL/L — SIGNIFICANT CHANGE UP (ref 96–108)
CHLORIDE SERPL-SCNC: 105 MMOL/L — SIGNIFICANT CHANGE UP (ref 96–108)
CO2 SERPL-SCNC: 20 MMOL/L — LOW (ref 22–31)
CO2 SERPL-SCNC: 22 MMOL/L — SIGNIFICANT CHANGE UP (ref 22–31)
CREAT SERPL-MCNC: 0.53 MG/DL — SIGNIFICANT CHANGE UP (ref 0.5–1.3)
CREAT SERPL-MCNC: 0.58 MG/DL — SIGNIFICANT CHANGE UP (ref 0.5–1.3)
GAS PNL BLDA: SIGNIFICANT CHANGE UP
GLUCOSE SERPL-MCNC: 114 MG/DL — HIGH (ref 70–99)
GLUCOSE SERPL-MCNC: 142 MG/DL — HIGH (ref 70–99)
HCG UR QL: NEGATIVE — SIGNIFICANT CHANGE UP
HCT VFR BLD CALC: 33 % — LOW (ref 34.5–45)
HGB BLD-MCNC: 10.7 G/DL — LOW (ref 11.5–15.5)
MAGNESIUM SERPL-MCNC: 2 MG/DL — SIGNIFICANT CHANGE UP (ref 1.6–2.6)
MCHC RBC-ENTMCNC: 29.2 PG — SIGNIFICANT CHANGE UP (ref 27–34)
MCHC RBC-ENTMCNC: 32.4 GM/DL — SIGNIFICANT CHANGE UP (ref 32–36)
MCV RBC AUTO: 90.2 FL — SIGNIFICANT CHANGE UP (ref 80–100)
NRBC # BLD: 0 /100 WBCS — SIGNIFICANT CHANGE UP (ref 0–0)
PHOSPHATE SERPL-MCNC: 2.8 MG/DL — SIGNIFICANT CHANGE UP (ref 2.5–4.5)
PLATELET # BLD AUTO: 212 K/UL — SIGNIFICANT CHANGE UP (ref 150–400)
POTASSIUM SERPL-MCNC: 3.8 MMOL/L — SIGNIFICANT CHANGE UP (ref 3.5–5.3)
POTASSIUM SERPL-MCNC: 4.2 MMOL/L — SIGNIFICANT CHANGE UP (ref 3.5–5.3)
POTASSIUM SERPL-SCNC: 3.8 MMOL/L — SIGNIFICANT CHANGE UP (ref 3.5–5.3)
POTASSIUM SERPL-SCNC: 4.2 MMOL/L — SIGNIFICANT CHANGE UP (ref 3.5–5.3)
RBC # BLD: 3.66 M/UL — LOW (ref 3.8–5.2)
RBC # FLD: 13 % — SIGNIFICANT CHANGE UP (ref 10.3–14.5)
RH IG SCN BLD-IMP: POSITIVE — SIGNIFICANT CHANGE UP
SODIUM SERPL-SCNC: 136 MMOL/L — SIGNIFICANT CHANGE UP (ref 135–145)
SODIUM SERPL-SCNC: 139 MMOL/L — SIGNIFICANT CHANGE UP (ref 135–145)
WBC # BLD: 11.15 K/UL — HIGH (ref 3.8–10.5)
WBC # FLD AUTO: 11.15 K/UL — HIGH (ref 3.8–10.5)

## 2021-12-02 PROCEDURE — 61782 SCAN PROC CRANIAL EXTRA: CPT

## 2021-12-02 PROCEDURE — 99291 CRITICAL CARE FIRST HOUR: CPT

## 2021-12-02 PROCEDURE — 15769 GRFG AUTOL SOFT TISS DIR EXC: CPT

## 2021-12-02 PROCEDURE — 62165 REMOVE PITUIT TUMOR W/SCOPE: CPT | Mod: 62

## 2021-12-02 PROCEDURE — 88360 TUMOR IMMUNOHISTOCHEM/MANUAL: CPT | Mod: 26

## 2021-12-02 PROCEDURE — 88342 IMHCHEM/IMCYTCHM 1ST ANTB: CPT | Mod: 26,59

## 2021-12-02 PROCEDURE — 88305 TISSUE EXAM BY PATHOLOGIST: CPT | Mod: 26

## 2021-12-02 PROCEDURE — 61781 SCAN PROC CRANIAL INTRA: CPT

## 2021-12-02 PROCEDURE — 88341 IMHCHEM/IMCYTCHM EA ADD ANTB: CPT | Mod: 26,59

## 2021-12-02 PROCEDURE — 99222 1ST HOSP IP/OBS MODERATE 55: CPT

## 2021-12-02 RX ORDER — ACETAMINOPHEN 500 MG
1000 TABLET ORAL ONCE
Refills: 0 | Status: COMPLETED | OUTPATIENT
Start: 2021-12-02 | End: 2021-12-02

## 2021-12-02 RX ORDER — SODIUM CHLORIDE 9 MG/ML
3 INJECTION INTRAMUSCULAR; INTRAVENOUS; SUBCUTANEOUS EVERY 8 HOURS
Refills: 0 | Status: DISCONTINUED | OUTPATIENT
Start: 2021-12-02 | End: 2021-12-02

## 2021-12-02 RX ORDER — CEFAZOLIN SODIUM 1 G
1000 VIAL (EA) INJECTION EVERY 8 HOURS
Refills: 0 | Status: COMPLETED | OUTPATIENT
Start: 2021-12-02 | End: 2021-12-03

## 2021-12-02 RX ORDER — DEXTROSE MONOHYDRATE, SODIUM CHLORIDE, AND POTASSIUM CHLORIDE 50; .745; 4.5 G/1000ML; G/1000ML; G/1000ML
1000 INJECTION, SOLUTION INTRAVENOUS
Refills: 0 | Status: DISCONTINUED | OUTPATIENT
Start: 2021-12-02 | End: 2021-12-03

## 2021-12-02 RX ORDER — ACETAMINOPHEN 500 MG
650 TABLET ORAL EVERY 6 HOURS
Refills: 0 | Status: DISCONTINUED | OUTPATIENT
Start: 2021-12-02 | End: 2021-12-04

## 2021-12-02 RX ORDER — SODIUM CHLORIDE 9 MG/ML
1000 INJECTION, SOLUTION INTRAVENOUS
Refills: 0 | Status: DISCONTINUED | OUTPATIENT
Start: 2021-12-02 | End: 2021-12-02

## 2021-12-02 RX ORDER — ONDANSETRON 8 MG/1
4 TABLET, FILM COATED ORAL EVERY 6 HOURS
Refills: 0 | Status: DISCONTINUED | OUTPATIENT
Start: 2021-12-02 | End: 2021-12-04

## 2021-12-02 RX ORDER — CABERGOLINE 0.5 MG/1
0.5 TABLET ORAL
Refills: 0 | Status: DISCONTINUED | OUTPATIENT
Start: 2021-12-02 | End: 2021-12-03

## 2021-12-02 RX ORDER — FAMOTIDINE 10 MG/ML
20 INJECTION INTRAVENOUS ONCE
Refills: 0 | Status: COMPLETED | OUTPATIENT
Start: 2021-12-02 | End: 2021-12-02

## 2021-12-02 RX ORDER — LIDOCAINE HCL 20 MG/ML
0.2 VIAL (ML) INJECTION ONCE
Refills: 0 | Status: DISCONTINUED | OUTPATIENT
Start: 2021-12-02 | End: 2021-12-02

## 2021-12-02 RX ORDER — POLYETHYLENE GLYCOL 3350 17 G/17G
17 POWDER, FOR SOLUTION ORAL
Refills: 0 | Status: DISCONTINUED | OUTPATIENT
Start: 2021-12-02 | End: 2021-12-04

## 2021-12-02 RX ORDER — HYDROMORPHONE HYDROCHLORIDE 2 MG/ML
0.25 INJECTION INTRAMUSCULAR; INTRAVENOUS; SUBCUTANEOUS
Refills: 0 | Status: DISCONTINUED | OUTPATIENT
Start: 2021-12-02 | End: 2021-12-03

## 2021-12-02 RX ORDER — SENNA PLUS 8.6 MG/1
2 TABLET ORAL AT BEDTIME
Refills: 0 | Status: DISCONTINUED | OUTPATIENT
Start: 2021-12-02 | End: 2021-12-04

## 2021-12-02 RX ORDER — CHOLECALCIFEROL (VITAMIN D3) 125 MCG
1 CAPSULE ORAL
Qty: 0 | Refills: 0 | DISCHARGE

## 2021-12-02 RX ORDER — HYDROMORPHONE HYDROCHLORIDE 2 MG/ML
0.5 INJECTION INTRAMUSCULAR; INTRAVENOUS; SUBCUTANEOUS ONCE
Refills: 0 | Status: DISCONTINUED | OUTPATIENT
Start: 2021-12-02 | End: 2021-12-03

## 2021-12-02 RX ORDER — HYDROMORPHONE HYDROCHLORIDE 2 MG/ML
0.5 INJECTION INTRAMUSCULAR; INTRAVENOUS; SUBCUTANEOUS
Refills: 0 | Status: DISCONTINUED | OUTPATIENT
Start: 2021-12-02 | End: 2021-12-02

## 2021-12-02 RX ORDER — CHOLECALCIFEROL (VITAMIN D3) 125 MCG
2000 CAPSULE ORAL DAILY
Refills: 0 | Status: DISCONTINUED | OUTPATIENT
Start: 2021-12-02 | End: 2021-12-04

## 2021-12-02 RX ADMIN — HYDROMORPHONE HYDROCHLORIDE 0.25 MILLIGRAM(S): 2 INJECTION INTRAMUSCULAR; INTRAVENOUS; SUBCUTANEOUS at 12:45

## 2021-12-02 RX ADMIN — Medication 1000 MILLIGRAM(S): at 23:00

## 2021-12-02 RX ADMIN — ONDANSETRON 4 MILLIGRAM(S): 8 TABLET, FILM COATED ORAL at 13:00

## 2021-12-02 RX ADMIN — ONDANSETRON 4 MILLIGRAM(S): 8 TABLET, FILM COATED ORAL at 22:28

## 2021-12-02 RX ADMIN — DEXTROSE MONOHYDRATE, SODIUM CHLORIDE, AND POTASSIUM CHLORIDE 70 MILLILITER(S): 50; .745; 4.5 INJECTION, SOLUTION INTRAVENOUS at 11:38

## 2021-12-02 RX ADMIN — HYDROMORPHONE HYDROCHLORIDE 0.25 MILLIGRAM(S): 2 INJECTION INTRAMUSCULAR; INTRAVENOUS; SUBCUTANEOUS at 12:30

## 2021-12-02 RX ADMIN — Medication 100 MILLIGRAM(S): at 15:59

## 2021-12-02 RX ADMIN — SENNA PLUS 2 TABLET(S): 8.6 TABLET ORAL at 22:31

## 2021-12-02 RX ADMIN — Medication 400 MILLIGRAM(S): at 22:45

## 2021-12-02 RX ADMIN — Medication 1000 MILLIGRAM(S): at 17:00

## 2021-12-02 RX ADMIN — Medication 400 MILLIGRAM(S): at 16:20

## 2021-12-02 RX ADMIN — FAMOTIDINE 20 MILLIGRAM(S): 10 INJECTION INTRAVENOUS at 13:00

## 2021-12-02 NOTE — PROGRESS NOTE ADULT - SUBJECTIVE AND OBJECTIVE BOX
ENT ISSUE/POD: POD #0 TSRP     HPI: 26F POD#0 from TRSP with fat graft in place, Pt doing well, slight h/a, relieved with pain meds. Pt denies any bleeding, no leaking, no salty or metallic taste in mouth, no PND.       PAST MEDICAL & SURGICAL HISTORY:  History of pituitary tumor  Benign    Chronic back pain  disc protrusions between L4-L5; Improved with physical therapy and exercises    Vitamin D deficiency    Heart palpitations  since 2018; echocardiogram ordered by endocrinologist - as per patient, results were normal    No significant past surgical history      Allergies    No Known Allergies    Intolerances      MEDICATIONS  (STANDING):  cabergoline 0.5 milliGRAM(s) Oral <User Schedule>  ceFAZolin   IVPB 1000 milliGRAM(s) IV Intermittent every 8 hours  ceFAZolin   IVPB 2000 milliGRAM(s) IV Intermittent once  chlorhexidine 2% Cloths 1 Application(s) Topical once  cholecalciferol 2000 Unit(s) Oral daily  HYDROmorphone  Injectable 0.5 milliGRAM(s) IV Push once  polyethylene glycol 3350 17 Gram(s) Oral two times a day  senna 2 Tablet(s) Oral at bedtime  sodium chloride 0.9% with potassium chloride 20 mEq/L 1000 milliLiter(s) (70 mL/Hr) IV Continuous <Continuous>    MEDICATIONS  (PRN):  acetaminophen     Tablet .. 650 milliGRAM(s) Oral every 6 hours PRN Temp greater or equal to 38C (100.4F), Mild Pain (1 - 3)  HYDROmorphone  Injectable 0.25 milliGRAM(s) IV Push every 10 minutes PRN Moderate Pain (4 - 6)  ondansetron Injectable 4 milliGRAM(s) IV Push every 6 hours PRN Nausea and/or Vomiting    social history: see consult     family history: see consult     ROS:   ENT: all negative except as noted in HPI   Pulm: denies SOB, cough, hemoptysis  Neuro: denies numbness/tingling, loss of sensation  Endo: denies heat/cold intolerance, excessive sweating      Vital Signs Last 24 Hrs  T(C): 36.4 (02 Dec 2021 17:00), Max: 36.6 (02 Dec 2021 06:02)  T(F): 97.5 (02 Dec 2021 17:00), Max: 97.9 (02 Dec 2021 06:02)  HR: 66 (02 Dec 2021 19:00) (60 - 78)  BP: 107/57 (02 Dec 2021 19:00) (99/55 - 122/75)  BP(mean): 78 (02 Dec 2021 19:00) (73 - 93)  RR: 16 (02 Dec 2021 19:00) (15 - 17)  SpO2: 100% (02 Dec 2021 19:00) (95% - 100%)                          10.7   11.15 )-----------( 212      ( 02 Dec 2021 12:55 )             33.0    12-02    136  |  104  |  11  ----------------------------<  114<H>  4.2   |  20<L>  |  0.53    Ca    8.3<L>      02 Dec 2021 18:44  Phos  2.8     12-02  Mg     2.0     12-02         PHYSICAL EXAM:  Gen: NAD  Skin: No rashes, bruises, or lesions  Head: Normocephalic, Atraumatic  Face: no edema, erythema, or fluctuance. Parotid glands soft without mass  Eyes: no scleral injection  Nose: Nares bilaterally patent, minimal serosang d/c from left nare   Mouth: No Stridor / Drooling / Trismus.  Mucosa moist, tongue/uvula midline, oropharynx clear  Neck: Flat, supple, no lymphadenopathy, trachea midline, no masses  Lymphatic: No lymphadenopathy  Resp: breathing easily, no stridor  Neuro: facial nerve intact, no facial droop

## 2021-12-02 NOTE — CONSULT NOTE ADULT - SUBJECTIVE AND OBJECTIVE BOX
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     Pituitary History:   Patient presented in 2018 with amenorrhea, with workup demonstrating hyperprolactinemia to 800s. In 2019, PRL was found to be 2600 with pituitary MRI demonstrating 1.9 x 2.2 x 2.0 cm macroadenoma. Remainder of HPA Axis workup was wnl. She was initiated on Cabergoline and doses were eventually increased, to most recent dose of 2mg twice a week. Her periods normalized on this dose, however PRL remained elevated, most recently in the 90s. Repeat MRI in October 2021 showed slight decrease in size of macroadenoma to 1.9 x 1.2 cm. She states she has seen opthalmology with normal visual field testing. Never had headaches or galactorrhea. Never used OCPs.     PAST MEDICAL & SURGICAL HISTORY:  Prolactinoma  Chronic back pain  disc protrusions between L4-L5; Improved with physical therapy and exercises  Vitamin D deficiency  Heart palpitations  since 2018; echocardiogram ordered by endocrinologist - as per patient, results were normal    FAMILY HISTORY:  No family history of adrenal or pituitary disorders    Social History: No tobacco use    Outpatient Medications:  Cabergoline 2 mg twice a week  Vitamin D 2000 units daily    MEDICATIONS  (STANDING):  cabergoline 2 milliGRAM(s) Oral <User Schedule>  ceFAZolin   IVPB 1000 milliGRAM(s) IV Intermittent every 8 hours  ceFAZolin   IVPB 2000 milliGRAM(s) IV Intermittent once  chlorhexidine 2% Cloths 1 Application(s) Topical once  cholecalciferol 2000 Unit(s) Oral daily  HYDROmorphone  Injectable 0.5 milliGRAM(s) IV Push once  polyethylene glycol 3350 17 Gram(s) Oral two times a day  senna 2 Tablet(s) Oral at bedtime  sodium chloride 0.9% with potassium chloride 20 mEq/L 1000 milliLiter(s) (70 mL/Hr) IV Continuous <Continuous>    MEDICATIONS  (PRN):  acetaminophen     Tablet .. 650 milliGRAM(s) Oral every 6 hours PRN Temp greater or equal to 38C (100.4F), Mild Pain (1 - 3)  HYDROmorphone  Injectable 0.25 milliGRAM(s) IV Push every 10 minutes PRN Moderate Pain (4 - 6)  ondansetron Injectable 4 milliGRAM(s) IV Push every 6 hours PRN Nausea and/or Vomiting      Allergies  No Known Allergies    PHYSICAL EXAM:  VITALS: T(C): 36.3 (12-02-21 @ 13:00)  T(F): 97.3 (12-02-21 @ 13:00), Max: 97.9 (12-02-21 @ 06:02)  HR: 60 (12-02-21 @ 15:00) (60 - 78)  BP: 104/65 (12-02-21 @ 15:00) (99/55 - 122/75)  RR:  (15 - 17)  SpO2:  (95% - 100%)  Wt(kg): --  GENERAL: NAD, sleepy, resting comfortably in bed  THYROID: Normal size, no palpable nodules  RESPIRATORY: Clear to auscultation bilaterally  CARDIOVASCULAR: Regular rate and rhythm; No murmurs; no peripheral edema  GI: Soft, nontender, non distended, normal bowel sounds  PSYCH: Alert and oriented x 3, reactive mood                              10.7   11.15 )-----------( 212      ( 02 Dec 2021 12:55 )             33.0       12-02    139  |  105  |  13  ----------------------------<  142<H>  3.8   |  22  |  0.58    EGFR if : 146  EGFR if non : 126    Ca    7.9<L>      12-02  Mg     2.0     12-02  Phos  2.8     12-02        Thyroid Function Tests:              Radiology:                HPI:  26 year old female with PMH vitamin D deficiency and 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     Pituitary History:   Patient presented in 2018 with amenorrhea, with workup demonstrating hyperprolactinemia to 800s. In 2019, PRL was found to be 2600 with pituitary MRI demonstrating 1.9 x 2.2 x 2.0 cm macroadenoma. Remainder of HPA Axis workup was wnl. She was initiated on Cabergoline and doses were eventually increased, to most recent dose of 2mg twice a week. Her periods normalized on this dose, however PRL remained elevated, most recently in the 90s. Repeat MRI in October 2021 showed slight decrease in size of macroadenoma to 1.9 x 1.2 cm. She states she has seen opthalmology with normal visual field testing. Never had headaches or galactorrhea. Never used OCPs.   Has a hx of palpitations with normal ECHO. No valvular pathology noted    PAST MEDICAL & SURGICAL HISTORY:  Prolactinoma  Chronic back pain  disc protrusions between L4-L5; Improved with physical therapy and exercises  Vitamin D deficiency  Heart palpitations  since 2018; echocardiogram ordered by endocrinologist - as per patient, results were normal    FAMILY HISTORY:  No family history of adrenal or pituitary disorders    Social History: No tobacco use    Outpatient Medications:  Cabergoline 2 mg twice a week  Vitamin D 2000 units daily    MEDICATIONS  (STANDING):  cabergoline 2 milliGRAM(s) Oral <User Schedule>  ceFAZolin   IVPB 1000 milliGRAM(s) IV Intermittent every 8 hours  ceFAZolin   IVPB 2000 milliGRAM(s) IV Intermittent once  chlorhexidine 2% Cloths 1 Application(s) Topical once  cholecalciferol 2000 Unit(s) Oral daily  HYDROmorphone  Injectable 0.5 milliGRAM(s) IV Push once  polyethylene glycol 3350 17 Gram(s) Oral two times a day  senna 2 Tablet(s) Oral at bedtime  sodium chloride 0.9% with potassium chloride 20 mEq/L 1000 milliLiter(s) (70 mL/Hr) IV Continuous <Continuous>    MEDICATIONS  (PRN):  acetaminophen     Tablet .. 650 milliGRAM(s) Oral every 6 hours PRN Temp greater or equal to 38C (100.4F), Mild Pain (1 - 3)  HYDROmorphone  Injectable 0.25 milliGRAM(s) IV Push every 10 minutes PRN Moderate Pain (4 - 6)  ondansetron Injectable 4 milliGRAM(s) IV Push every 6 hours PRN Nausea and/or Vomiting      Allergies  No Known Allergies    PHYSICAL EXAM:  VITALS: T(C): 36.3 (12-02-21 @ 13:00)  T(F): 97.3 (12-02-21 @ 13:00), Max: 97.9 (12-02-21 @ 06:02)  HR: 60 (12-02-21 @ 15:00) (60 - 78)  BP: 104/65 (12-02-21 @ 15:00) (99/55 - 122/75)  RR:  (15 - 17)  SpO2:  (95% - 100%)  Wt(kg): --  GENERAL: NAD, sleepy, resting comfortably in bed  THYROID: Normal size, no palpable nodules  RESPIRATORY: Clear to auscultation bilaterally  CARDIOVASCULAR: Regular rate and rhythm; No murmurs; no peripheral edema  GI: Soft, nontender, non distended, normal bowel sounds  PSYCH: Alert and oriented x 3, reactive mood                              10.7   11.15 )-----------( 212      ( 02 Dec 2021 12:55 )             33.0       12-02    139  |  105  |  13  ----------------------------<  142<H>  3.8   |  22  |  0.58    EGFR if : 146  EGFR if non : 126    Ca    7.9<L>      12-02  Mg     2.0     12-02  Phos  2.8     12-02        Thyroid Function Tests:              Radiology:

## 2021-12-02 NOTE — PROGRESS NOTE ADULT - SUBJECTIVE AND OBJECTIVE BOX
SUMMARY: 26 year old female with PMH vitamin D deficiency and heart palpitations (Dx in 2018 s/p echocardiogram WNL) reports c/o amenorrhea x 6 months back in October 2019 with blood work that revealed an elevated prolactin level s/p brain MRI revealed a pituitary tumor and pt was placed on Cabergoline medication. Pt denies any other neurologic symptoms and reports that her menstrual cycle has returned as a regular cycle since starting the medication with LMP 10/22/21. Pt now s/p scheduled endoscopic transphenoidal removal of a pituitary tumor on 12/2/21 with Dr. Murillo    [facility] COURSE: seen in NSCU. [place] of umbilical graft for CSF leak ppx in setting of arachnoid bleb. EBL 25 cc    24 HOUR EVENTS: Emesisx1 postop and feeling sleepy but no metallic or salty taste.     REVIEW OF SYSTEMS: Neg except as above    ALLERGIES: no Allergies    Intolerances        VITALS/DATA/ORDERS: [x] Reviewed    DEVICES:   [x] PIVs [x] arterial line [x] viera     EXAMINATION:  General: No acute distress  HEENT: Anicteric sclerae  Cardiac: [id number]  Lungs: Clear  Abdomen: Soft, non-tender, +BS  Extremities: No c/c/e  Skin/Incision Site: Clean, dry and intact  Neurologic: Awake, alert, fully oriented, follows commands, PERRL, VFFtc, EOMI, face symmetric, tongue midline, no drift, full strength

## 2021-12-02 NOTE — CONSULT NOTE ADULT - ATTENDING COMMENTS
Agree with assessment and plan as above by Dr. Appiah. Reviewed all pertinent labs, glucose values, and imaging studies. Modifications made as indicated above. Evaluating this 25 y/o female for resistant prolactinoma on high doses of dopamine agonist as outpatient with persistent hyperprolactinemia and not much change on MRI on treatment. No other hormonal abnormalities noted pre-op. Now s/p TSS. Follow-up prolactin, check cortisol on pod #2. No signs of DI at this time. Monitor strict I's and O's. Can continue with cabergoline post-op given high risk of recurrence.    Toi Shah D.O  [phone]

## 2021-12-02 NOTE — BRIEF OPERATIVE NOTE - OPERATION/FINDINGS
endoscopic endonasal transphenoidal resection of pituitary tumor; harvest of umbilical fat graft for CSF leak prophylaxis in setting of arachnoid bleb

## 2021-12-02 NOTE — CHART NOTE - NSCHARTNOTEFT_GEN_A_CORE
CAPRINI SCORE [CLOT] Score on Admission for     AGE RELATED RISK FACTORS                                                       MOBILITY RELATED FACTORS  [ ] Age 41-60 years                                            (1 Point)                  [ ] Bed rest                                                        (1 Point)  [ ] Age: 61-74 years                                           (2 Points)                 [ ] Plaster cast                                                   (2 Points)  [ ] Age= 75 years                                              (3 Points)                 [ ] Bed bound for more than 72 hours                 (2 Points)    DISEASE RELATED RISK FACTORS                                               GENDER SPECIFIC FACTORS  [ ] Edema in the lower extremities                       (1 Point)                  [ ] Pregnancy                                                     (1 Point)  [ ] Varicose veins                                               (1 Point)                  [ ] Post-partum < 6 weeks                                   (1 Point)             [ ] BMI > 25 Kg/m2                                            (1 Point)                  [ ] Hormonal therapy  or oral contraception          (1 Point)                 [ ] Sepsis (in the previous month)                        (1 Point)                  [ ] History of pregnancy complications                 (1 point)  [ ] Pneumonia or serious lung disease                                               [ ] Unexplained or recurrent                     (1 Point)           (in the previous month)                               (1 Point)  [ ] Abnormal pulmonary function test                     (1 Point)                 SURGERY RELATED RISK FACTORS (include planned surgeries)  [ ] Acute myocardial infarction                              (1 Point)                 [ ]  Section                                             (1 Point)  [ ] Congestive heart failure (in the previous month)  (1 Point)               [ ] Minor surgery                                                  (1 Point)   [ ] Inflammatory bowel disease                             (1 Point)                 [ ] Arthroscopic surgery                                        (2 Points)  [ ] Central venous access                                      (2 Points)                [x ] General surgery lasting more than 45 minutes   (2 Points)       [ ] Stroke (in the previous month)                          (5 Points)               [ ] Elective arthroplasty                                         (5 Points)            [ ] Current or past malignancy                                (2 Points)                                                                                                     HEMATOLOGY RELATED FACTORS                                                 TRAUMA RELATED RISK FACTORS  [ ] Prior episodes of VTE                                     (3 Points)                [ ] Fracture of the hip, pelvis, or leg                       (5 Points)  [ ] Positive family history for VTE                         (3 Points)                 [ ] Acute spinal cord injury (in the previous month)  (5 Points)  [ ] Prothrombin 28875 A                                     (3 Points)                 [ ] Paralysis  (less than 1 month)                             (5 Points)  [ ] Factor V Leiden                                             (3 Points)                  [ ] Multiple Trauma within 1 month                        (5 Points)  [ ] Lupus anticoagulants                                     (3 Points)                                                           [ ] Anticardiolipin antibodies                               (3 Points)                                                       [ ] High homocysteine in the blood                      (3 Points)                                             [ ] Other congenital or acquired thrombophilia      (3 Points)                                                [ ] Heparin induced thrombocytopenia                  (3 Points)                                          Total Score [  2        ]    Risk:  Very low 0   Low 1 to 2   Moderate 3 to 4   High =5       VTE Prophylasix Recommednations:  [ x] mechanical pneumatic compression devices                                      [ ] contraindicated: _____________________  [ ] chemo prophylasix                                                                                   [ ] contraindicated _____________________    **** HIGH LIKELIHOOD DVT PRESENT ON ADMISSION  [ ] (please order LE dopplers within 24 hours of admission)

## 2021-12-02 NOTE — PATIENT PROFILE ADULT - FALL HARM RISK - HARM RISK INTERVENTIONS

## 2021-12-02 NOTE — PROGRESS NOTE ADULT - ASSESSMENT
ASSESSMENT:   26 year old female with PMH vitamin D deficiency and heart palpitations (Dx in 2018 s/p echocardiogram WNL) s/p scheduled endoscopic transphenoidal removal of a pituitary tumor w abd fat graft on 12/2/21 with Dr. Murillo.    NEURO:  [id number]  MRI post-op, Pain control  Skull base precautions  cortisol check  cont cabergoline 2 q wed and sat  DI watch  Activity: [] OOB as tolerated [] Bedrest [x] PT [x] OT [] PMNR    PULM: RA  Incentive spirometry, mobilize as tolerated    CV:  Keep -150mmHg, d/c a-line in AM    RENAL:  IVF until good PO intake, d/c viera in AM if UOP nml    GI:  Diet: Dysphagia screen and then advance diet as tolerated  Bowel regimen standing    ENDO:   Goal euglycemia (-180)    HEME/ONC:  VTE prophylaxis: [x] SCDs  hold chemoprophylaxis due to: postop    ID:  Ainsley-op antibiotics ancef    MISC:    SOCIAL/FAMILY:  [] awaiting [x] updated boyfriend Rigo at bedside [] family meeting    CODE STATUS:  [x] Full Code [] DNR [] DNI [] Palliative/Comfort Care    DISPOSITION:  [] ICU [] Stroke Unit [] Floor [] EMU [] RCU [] PCU    [x] Patient is at high risk of neurologic deterioration/death due to:  infection, seizures, bleeding, stroke    Time seen: 20  Time spent: 45 critical care minutes    Contact: [phone] ASSESSMENT:   26 year old female with PMH vitamin D deficiency and heart palpitations (Dx in 2018 s/p echocardiogram WNL) s/p scheduled endoscopic transphenoidal removal of a pituitary tumor w abd fat graft on 12/2/21 with Dr. Murillo.    NEURO:  [id number]  MRI post-op, Pain control  Skull base precautions  cortisol check  cont cabergoline 2 q wed and sat  DI watch  Activity: [] OOB as tolerated [] Bedrest [x] PT [x] OT [] PMNR    PULM: RA  Incentive spirometry, mobilize as tolerated    CV:  Keep -150mmHg, d/c a-line in AM    RENAL:  IVF until good PO intake, d/c viera in AM if UOP nml    GI:  Diet: Dysphagia screen and then advance diet as tolerated  Bowel regimen standing    ENDO:   Goal euglycemia (-180)    HEME/ONC:  VTE prophylaxis: [x] SCDs  hold chemoprophylaxis due to: postop    ID:  Ainsley-op antibiotics ancef    MISC:    SOCIAL/FAMILY:  [] awaiting [x] updated boyfriend Rigo at bedside [] family meeting    CODE STATUS:  [x] Full Code [] DNR [] DNI [] Palliative/Comfort Care    DISPOSITION:  [x] ICU [] Stroke Unit [] Floor [] EMU [] RCU [] PCU    [x] Patient is at high risk of neurologic deterioration/death due to: post op bleeding, CSF leak, DI, infection    Time spent: 35 critical care minutes    Contact: [phone]

## 2021-12-02 NOTE — PROGRESS NOTE ADULT - SUBJECTIVE AND OBJECTIVE BOX
NEUROCRITICAL CARE EVENING NOTE    DAY EVENTS:    - Admitted. POD0 s/p TSPR for resistant prolactinoma       VITALS/IMAGING/DATA  - Reviewed    ALLERGIES:   - Reviewed      MEDICATIONS:  - Reviewed      PHYSICAL EXAM:    General: calm  CVS: RRR  Pulm: CTAB  GI: Soft, NTND  Extremities: No LE Edema  Neuro: AOx3, PERRL, EOMI, facial symmetrical, fluent speech, motor 5/5 throughout, no PND, sensation in tact

## 2021-12-02 NOTE — BRIEF OPERATIVE NOTE - NSICDXBRIEFPROCEDURE_GEN_ALL_CORE_FT
PROCEDURES:  Endoscopic transphenoidal or transnasal resection of pituitary tumor 02-Dec-2021 10:31:31  Jonatan Cabrera

## 2021-12-02 NOTE — PATIENT PROFILE ADULT - VISION (WITH CORRECTIVE LENSES IF THE PATIENT USUALLY WEARS THEM):
Corrective Glasses/Partially impaired: cannot see medication labels or newsprint, but can see obstacles in path, and the surrounding layout; can count fingers at arm's length Corrective Glasses/Normal vision: sees adequately in most situations; can see medication labels, newsprint

## 2021-12-02 NOTE — PROGRESS NOTE ADULT - ASSESSMENT
NEURO:  - neuro checks q1h  - repeat CTH AM  - MRI WWO   - DI watch, endocrine labs, Cortisol x3d, AM cortisol (12/3 - )  - pain control  - pituitary precautions  - Activity: bed rest today, PT/OT    CVS:  - SBP goal 110 -140  - cardene PRN to meet SBP goal    PULM:  - RA, maintain sats >92%  - no IS, pituitary precautions    RENAL:  - Fluids: NS 75cc/hr  - strict IOs    GI:  - Diet: CLD, ADAT  - GI prophylaxis: N/A  - Bowel regimen    ENDO:   - FS goal 120-180  - AM cortisol x3 days, prolactin AM (12/2 - )  - endo labs am  - DI watch  - c/w cabergoline 0.5mg twice/week (WED, SAT) per ENDO  - If UOP > 250cc/hr x 2 consecutive hours or if >500 cc/hr x 1hr, or if serum sodium is > 145, check urine osm and can give dose of DDAVP x 1    HEME/ONC:  - SCDs  - Chemoppx: hold due to fresh post op      ID:  - monitor for fevers   Prolactinoma status post transphenoidal pituitary adenoma resection     NEURO:  - neuro checks q1h  - repeat CTH AM  - MRI WWO   - DI watch, endocrine labs, Cortisol x3d, AM cortisol (12/3 - )  - pain control  - pituitary precautions  - Activity: bed rest today, PT/OT    CVS:  - SBP goal 110 -140  - cardene PRN to meet SBP goal    PULM:  - RA, maintain sats >92%  - no IS, pituitary precautions    RENAL:  - Fluids: NS 75cc/hr  - strict IOs  - monitor for DI    GI:  - Diet: CLD, ADAT  - GI prophylaxis: N/A  - Bowel regimen    ENDO:   - FS goal 120-180  - AM cortisol x3 days, prolactin AM (12/2 - )  - endo labs am  - DI watch  - c/w cabergoline 0.5mg twice/week (WED, SAT) per ENDO  - If UOP > 250cc/hr x 2 consecutive hours or if >500 cc/hr x 1hr, or if serum sodium is > 145, check urine osm and can give dose of DDAVP x 1    HEME/ONC:  - SCDs  - Chemoppx: hold due to fresh post op      ID:  - monitor for fevers    At risk of death/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

## 2021-12-02 NOTE — PATIENT PROFILE ADULT - FALL HARM RISK - ATTEMPT OOB
Ongoing  Goal: Diagnostic test results will improve  Description: Diagnostic test results will improve  Outcome: Ongoing  Goal: Will remain free from infection  Description: Will remain free from infection  Outcome: Ongoing  Goal: Ability to maintain clinical measurements within normal limits will improve  Description: Ability to maintain clinical measurements within normal limits will improve  Outcome: Ongoing  Goal: Will show no signs and symptoms of electrolyte imbalance  Description: Will show no signs and symptoms of electrolyte imbalance  Outcome: Ongoing     Problem: Nutritional:  Goal: Nutritional status will improve  Description: Nutritional status will improve  Outcome: Ongoing     Problem: Respiratory:  Goal: Ability to maintain normal respiratory secretions will improve  Description: Ability to maintain normal respiratory secretions will improve  Outcome: Ongoing     Problem: Skin Integrity:  Goal: Demonstration of wound healing without infection will improve  Description: Demonstration of wound healing without infection will improve  Outcome: Ongoing  Goal: Complications related to intravenous access or infusion will be avoided or minimized  Description: Complications related to intravenous access or infusion will be avoided or minimized  Outcome: Ongoing  Goal: Will show no infection signs and symptoms  Description: Will show no infection signs and symptoms  Outcome: Ongoing  Goal: Absence of new skin breakdown  Description: Absence of new skin breakdown  Outcome: Ongoing     Problem: Anxiety:  Goal: Level of anxiety will decrease  Description: Level of anxiety will decrease  Outcome: Ongoing     Problem: Fluid Volume:  Goal: Ability to achieve a balanced intake and output will improve  Description: Ability to achieve a balanced intake and output will improve  Outcome: Ongoing     Problem: Nutrition  Goal: Optimal nutrition therapy  Outcome: Ongoing     Problem: HEMODYNAMIC STATUS  Goal: Patient has stable vital signs and fluid balance  Outcome: Ongoing     Problem: ACTIVITY INTOLERANCE/IMPAIRED MOBILITY  Goal: Mobility/activity is maintained at optimum level for patient  Outcome: Ongoing     Problem: COMMUNICATION IMPAIRMENT  Goal: Ability to express needs and understand communication  Outcome: Ongoing No

## 2021-12-02 NOTE — CONSULT NOTE ADULT - ASSESSMENT
26 year old female with PMH vitamin D deficiency and 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     #Prolactinoma s/p transphenoidal resection  PRL levels 2600 -> 93 (in November 2021), preop. While on Cabergoline 2 mg twice a week  s/p transphenoidal pituitary tumor resection on 12/1/21 due to inadequate response to high dose Cabergoline (large tumor size and continued elevation in PRL levels)   -Repeat PRL level tomorrow AM  -Check AM Cortisol level  -F/u repeat MRI tomorrow AM  -Stop Cabergoline at this time, can reassess need for this in the future, vs. radiation therapy        26 year old female with PMH vitamin D deficiency and 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     #Prolactinoma s/p transphenoidal resection  PRL levels 2600 -> 93 (in November 2021), preop. While on Cabergoline 2 mg twice a week  s/p transphenoidal pituitary tumor resection on 12/1/21 due to inadequate response to high dose Cabergoline (large tumor size and continued elevation in PRL levels)   -Repeat PRL level tomorrow AM  -Check AM Cortisol level  -F/u repeat MRI tomorrow  -Stop Cabergoline at this time, can reassess need for this in the future, vs. radiation therapy for refractory disease    Ophelia Appiah MD  Endocrine Fellow  Pager: [phone]/DEEPALI [phone]  Consults 9am-5pm: [phone]  After 5pm and weekends: [phone]         26 year old female with PMH vitamin D deficiency and 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     #Prolactinoma s/p transphenoidal resection  PRL levels 2600 -> 93 (in November 2021), preop. While on Cabergoline 2 mg twice a week  s/p transphenoidal pituitary tumor resection on 12/1/21 due to inadequate response to high dose Cabergoline (large tumor size and continued elevation in PRL levels)   -Continue Cabergoline, but lower dose to 0.5 mg twice a week (Wed and Sun)  -Check AM Cortisol level on 12/4  -F/u repeat MRI tomorrow  -DI Watch: Strict I+O and check sodium levels BID.   -If UOP > 250cc/hr x 2 consecutive hours or if >500 cc/hr x 1hr, or if serum sodium is > 145, check urine osm and can give dose of DDAVP x 1.    Discussed with Dr. Shah and primary team    Ophelia Appiah MD  Endocrine Fellow  Pager: [phone]/DEEPALI [phone]  Consults 9am-5pm: [phone]  After 5pm and weekends: [phone]         26 year old female with PMH vitamin D deficiency and 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     #Prolactinoma s/p transphenoidal resection  PRL levels 2600 -> 93 (in November 2021), preop. While on Cabergoline 2 mg twice a week  s/p transphenoidal pituitary tumor resection on 12/1/21 due to inadequate response to high dose Cabergoline (large tumor size and continued elevation in PRL levels)   -Continue Cabergoline, but lower dose to 0.5 mg twice a week (Wed and Sat)  -Check AM Cortisol level   -F/u repeat MRI tomorrow  -DI Watch: Strict I+O and check sodium levels BID.   -If UOP > 250cc/hr x 2 consecutive hours or if >500 cc/hr x 1hr, or if serum sodium is > 145, check urine osm and can give dose of DDAVP x 1.    Discussed with Dr. Shah and primary team    Ophelia Appiah MD  Endocrine Fellow  Pager: [phone]/DEEPALI [phone]  Consults 9am-5pm: [phone]  After 5pm and weekends: [phone]

## 2021-12-03 LAB
ANION GAP SERPL CALC-SCNC: 11 MMOL/L — SIGNIFICANT CHANGE UP (ref 5–17)
ANION GAP SERPL CALC-SCNC: 9 MMOL/L — SIGNIFICANT CHANGE UP (ref 5–17)
BUN SERPL-MCNC: 8 MG/DL — SIGNIFICANT CHANGE UP (ref 7–23)
BUN SERPL-MCNC: 8 MG/DL — SIGNIFICANT CHANGE UP (ref 7–23)
CALCIUM SERPL-MCNC: 8.4 MG/DL — SIGNIFICANT CHANGE UP (ref 8.4–10.5)
CALCIUM SERPL-MCNC: 8.5 MG/DL — SIGNIFICANT CHANGE UP (ref 8.4–10.5)
CHLORIDE SERPL-SCNC: 107 MMOL/L — SIGNIFICANT CHANGE UP (ref 96–108)
CHLORIDE SERPL-SCNC: 109 MMOL/L — HIGH (ref 96–108)
CO2 SERPL-SCNC: 21 MMOL/L — LOW (ref 22–31)
CO2 SERPL-SCNC: 22 MMOL/L — SIGNIFICANT CHANGE UP (ref 22–31)
CORTIS AM PEAK SERPL-MCNC: 5.1 UG/DL — LOW (ref 6–18.4)
CREAT SERPL-MCNC: 0.52 MG/DL — SIGNIFICANT CHANGE UP (ref 0.5–1.3)
CREAT SERPL-MCNC: 0.53 MG/DL — SIGNIFICANT CHANGE UP (ref 0.5–1.3)
GLUCOSE SERPL-MCNC: 113 MG/DL — HIGH (ref 70–99)
GLUCOSE SERPL-MCNC: 124 MG/DL — HIGH (ref 70–99)
HCT VFR BLD CALC: 32.3 % — LOW (ref 34.5–45)
HGB BLD-MCNC: 10.5 G/DL — LOW (ref 11.5–15.5)
MAGNESIUM SERPL-MCNC: 2.1 MG/DL — SIGNIFICANT CHANGE UP (ref 1.6–2.6)
MAGNESIUM SERPL-MCNC: 2.2 MG/DL — SIGNIFICANT CHANGE UP (ref 1.6–2.6)
MCHC RBC-ENTMCNC: 29.3 PG — SIGNIFICANT CHANGE UP (ref 27–34)
MCHC RBC-ENTMCNC: 32.5 GM/DL — SIGNIFICANT CHANGE UP (ref 32–36)
MCV RBC AUTO: 90.2 FL — SIGNIFICANT CHANGE UP (ref 80–100)
NRBC # BLD: 0 /100 WBCS — SIGNIFICANT CHANGE UP (ref 0–0)
PHOSPHATE SERPL-MCNC: 2.2 MG/DL — LOW (ref 2.5–4.5)
PHOSPHATE SERPL-MCNC: 3.2 MG/DL — SIGNIFICANT CHANGE UP (ref 2.5–4.5)
PLATELET # BLD AUTO: 229 K/UL — SIGNIFICANT CHANGE UP (ref 150–400)
POTASSIUM SERPL-MCNC: 4.1 MMOL/L — SIGNIFICANT CHANGE UP (ref 3.5–5.3)
POTASSIUM SERPL-MCNC: 4.3 MMOL/L — SIGNIFICANT CHANGE UP (ref 3.5–5.3)
POTASSIUM SERPL-SCNC: 4.1 MMOL/L — SIGNIFICANT CHANGE UP (ref 3.5–5.3)
POTASSIUM SERPL-SCNC: 4.3 MMOL/L — SIGNIFICANT CHANGE UP (ref 3.5–5.3)
PROLACTIN SERPL-MCNC: 30.3 NG/ML — HIGH (ref 3.4–24.1)
RBC # BLD: 3.58 M/UL — LOW (ref 3.8–5.2)
RBC # FLD: 13.1 % — SIGNIFICANT CHANGE UP (ref 10.3–14.5)
SODIUM SERPL-SCNC: 139 MMOL/L — SIGNIFICANT CHANGE UP (ref 135–145)
SODIUM SERPL-SCNC: 140 MMOL/L — SIGNIFICANT CHANGE UP (ref 135–145)
WBC # BLD: 15.42 K/UL — HIGH (ref 3.8–10.5)
WBC # FLD AUTO: 15.42 K/UL — HIGH (ref 3.8–10.5)

## 2021-12-03 PROCEDURE — 99024 POSTOP FOLLOW-UP VISIT: CPT

## 2021-12-03 PROCEDURE — 99233 SBSQ HOSP IP/OBS HIGH 50: CPT

## 2021-12-03 PROCEDURE — 99232 SBSQ HOSP IP/OBS MODERATE 35: CPT | Mod: GC

## 2021-12-03 PROCEDURE — 70553 MRI BRAIN STEM W/O & W/DYE: CPT | Mod: 26

## 2021-12-03 RX ORDER — CABERGOLINE 0.5 MG/1
0.5 TABLET ORAL
Refills: 0 | Status: DISCONTINUED | OUTPATIENT
Start: 2021-12-03 | End: 2021-12-03

## 2021-12-03 RX ORDER — SODIUM CHLORIDE 0.65 %
2 AEROSOL, SPRAY (ML) NASAL
Refills: 0 | Status: DISCONTINUED | OUTPATIENT
Start: 2021-12-03 | End: 2021-12-04

## 2021-12-03 RX ADMIN — Medication 2 SPRAY(S): at 17:51

## 2021-12-03 RX ADMIN — Medication 650 MILLIGRAM(S): at 08:00

## 2021-12-03 RX ADMIN — POLYETHYLENE GLYCOL 3350 17 GRAM(S): 17 POWDER, FOR SOLUTION ORAL at 10:05

## 2021-12-03 RX ADMIN — SENNA PLUS 2 TABLET(S): 8.6 TABLET ORAL at 21:17

## 2021-12-03 RX ADMIN — Medication 650 MILLIGRAM(S): at 08:18

## 2021-12-03 RX ADMIN — Medication 650 MILLIGRAM(S): at 17:30

## 2021-12-03 RX ADMIN — Medication 2000 UNIT(S): at 11:23

## 2021-12-03 RX ADMIN — Medication 650 MILLIGRAM(S): at 16:54

## 2021-12-03 RX ADMIN — Medication 2 SPRAY(S): at 14:40

## 2021-12-03 RX ADMIN — Medication 2 SPRAY(S): at 23:33

## 2021-12-03 RX ADMIN — POLYETHYLENE GLYCOL 3350 17 GRAM(S): 17 POWDER, FOR SOLUTION ORAL at 21:17

## 2021-12-03 RX ADMIN — Medication 100 MILLIGRAM(S): at 01:00

## 2021-12-03 NOTE — PROGRESS NOTE ADULT - PROBLEM SELECTOR PLAN 1
- continue humidified face tent  - nasal saline, 2 sprays to b/l nares 4 times a day.  - monitor for signs of Epistaxis and CSF leak  - avoid nasal trauma, heavy lifting and bending at waist.  - Care a/p neurosurgery
- continue humidified face tent  - nasal saline, 2 sprays to b/l nares 4 times a day.  - monitor for signs of Epistaxis and CSF leak  - avoid nasal trauma, heavy lifting and bending at waist.  - Care a/p neurosurgery.

## 2021-12-03 NOTE — OCCUPATIONAL THERAPY INITIAL EVALUATION ADULT - ADDITIONAL COMMENTS
CT head 12/1-A sellar mass suggesting suggesting a pituitary macroadenoma is better seen on prior MRI.

## 2021-12-03 NOTE — OCCUPATIONAL THERAPY INITIAL EVALUATION ADULT - NAME OF CLINICIAN
General: non-toxic, NAD  HEENT: NCAT, PERRL  Cardiac: RRR, no murmurs, 2+ peripheral pulses  Chest: CTA  Abdomen: soft, non-distended, bowel sounds present, +very mild supraumbilical ttp -Miarnda's -ttp at mcbernies -rebound, -suprapubic ttp  Extremities: no peripheral edema, calf tenderness, or leg size discrepancies  Skin: no rashes  Neuro: AAOx4, 5+motor, sensory grossly intact  Psych: mood and affect appropriate
MITA Gibson

## 2021-12-03 NOTE — PHYSICAL THERAPY INITIAL EVALUATION ADULT - PERTINENT HX OF CURRENT PROBLEM, REHAB EVAL
26y F with PMH vit D deficiency and heart palpitations. Reports c/o amenorrhea x 6 months back in 10/2019 with blood work that revealed an elevated prolactin level s/p brain MRI revealed a pituitary tumor and pt was placed on Cabergoline medication. Pt denies any other neurologic symptoms and reports that her menstrual cycle has returned as a regular cycle since starting the medication with LMP 10/22/21. Pt now POD1 s/p endoscopic transphenoidal removal of a pituitary tumor.

## 2021-12-03 NOTE — PHYSICAL THERAPY INITIAL EVALUATION ADULT - ADDITIONAL COMMENTS
Pt lives with significant other in apt with 0 steps throughout. PTA, independent in all functional mobility without use of AD.

## 2021-12-03 NOTE — PROGRESS NOTE ADULT - ASSESSMENT
TR MALONE  26F hx amenorrhea x60 found to have prolactinoma with some symptomatic improvement on cabergoline, now s/p TSP for resection, neuro intact postop.   - q1h neuro checks ON  - fu AM prolactin, cortisol and pit labs  - cont cabergoline  - MRI brain pit protocol in AM  - DI watch  - SQL tonight

## 2021-12-03 NOTE — PROGRESS NOTE ADULT - SUBJECTIVE AND OBJECTIVE BOX
Admitted for: prolactinoma resection    Following for: s/p transphenoidal resection    Subjective: Patient is feeling well. Adequate thirst and urination. Flowsheets reviewed, 150-250 cc/hr.      MEDICATIONS  (STANDING):  cabergoline 0.5 milliGRAM(s) Oral <User Schedule>  ceFAZolin   IVPB 2000 milliGRAM(s) IV Intermittent once  chlorhexidine 2% Cloths 1 Application(s) Topical once  cholecalciferol 2000 Unit(s) Oral daily  polyethylene glycol 3350 17 Gram(s) Oral two times a day  senna 2 Tablet(s) Oral at bedtime  sodium chloride 0.65% Nasal 2 Spray(s) Both Nostrils four times a day    MEDICATIONS  (PRN):  acetaminophen     Tablet .. 650 milliGRAM(s) Oral every 6 hours PRN Temp greater or equal to 38C (100.4F), Mild Pain (1 - 3)  ondansetron Injectable 4 milliGRAM(s) IV Push every 6 hours PRN Nausea and/or Vomiting    PHYSICAL EXAM:  VITALS: T(C): 37 (12-03-21 @ 15:28)  T(F): 98.6 (12-03-21 @ 15:28), Max: 99.1 (12-03-21 @ 12:33)  HR: 67 (12-03-21 @ 15:28) (56 - 84)  BP: 109/62 (12-03-21 @ 15:28) (9/57 - 114/68)  RR:  (14 - 19)  SpO2:  (98% - 100%)  Wt(kg): --  GENERAL: NAD  THYROID: Normal size, no palpable nodules  RESPIRATORY: Clear to auscultation bilaterally  CARDIOVASCULAR: Regular rate and rhythm; No murmurs; no peripheral edema  GI: Soft, nontender, non distended, normal bowel sounds  PSYCH: Alert and oriented x 3, reactive mood              12-03    140  |  109<H>  |  8   ----------------------------<  113<H>  4.1   |  22  |  0.53    EGFR if : 151  EGFR if non : 130    Ca    8.5      12-03  Mg     2.2     12-03  Phos  2.2     12-03        Thyroid Function Tests:

## 2021-12-03 NOTE — OCCUPATIONAL THERAPY INITIAL EVALUATION ADULT - PERTINENT HX OF CURRENT PROBLEM, REHAB EVAL
27yo F with PMH vitamin D deficiency and heart palpitations (Dx in 2018 s/p echocardiogram WNL) reports c/o amenorrhea x 6 months back in October 2019 with blood work that revealed an elevated prolactin level s/p brain MRI revealed a pituitary tumor and pt was placed on Cabergoline medication.

## 2021-12-03 NOTE — PROGRESS NOTE ADULT - SUBJECTIVE AND OBJECTIVE BOX
NEUROCRITICAL CARE Progress NOTE    24H EVENTS:    - Admitted. POD1 s/p TSPR for resistant prolactinoma. UOP high intermittently  For MRI  Cabergoline dose adjusted by endo      VITALS/IMAGING/DATA  - Reviewed    ALLERGIES:   - Reviewed      MEDICATIONS:  - Reviewed      PHYSICAL EXAM:    General: calm  CVS: RRR  Pulm: CTAB  GI: Soft, NTND  Extremities: No LE Edema  Neuro: AOx3, PERRL, EOMI, facial symmetrical, fluent speech, motor 5/5 throughout, no PND, sensation in tact

## 2021-12-03 NOTE — OCCUPATIONAL THERAPY INITIAL EVALUATION ADULT - PRECAUTIONS/LIMITATIONS, REHAB EVAL
Pt denies any other neurologic symptoms and reports that her menstrual cycle has returned as a regular cycle since starting the medication with LMP 10/22/21. Pt now presents to [facility] for scheduled endoscopic transphenoidal removal of a pituitary tumor on 12/2/21 with Dr. Murillo./fall precautions

## 2021-12-03 NOTE — PROGRESS NOTE ADULT - ASSESSMENT
Prolactinoma status post transphenoidal pituitary adenoma resection     NEURO:  - neuro checks q4h  - MRI WWO   - DI watch, endocrine labs, Cortisol x3d, AM cortisol (12/3 - )  - pain control  - pituitary precautions  - Activity: bed rest today, PT/OT    CVS:  - SBP goal 110 -160  - cardene PRN to meet SBP goal    PULM:  - RA, maintain sats >92%  - no IS, pituitary precautions    RENAL:  - Fluids: IVL  - strict IOs  - monitor for DI    GI:  - Diet: CLD, ADAT  - GI prophylaxis: N/A  - Bowel regimen    ENDO:   - FS goal 120-180  - AM cortisol x3 days, prolactin AM (12/2 - )  - endo labs am  - DI watch  - c/w cabergoline 0.5mg twice/week (WED, SAT) per ENDO  - If UOP > 250cc/hr x 2 consecutive hours or if >500 cc/hr x 1hr, or if serum sodium is > 145, check urine osm and can give dose of DDAVP x 1    HEME/ONC:  - SCDs  - Chemoppx: resume SQL if MRI stable    ID:  - monitor for fevers      Dispo floor    At risk of death/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

## 2021-12-03 NOTE — PROGRESS NOTE ADULT - SUBJECTIVE AND OBJECTIVE BOX
Pt seen and examined.  Awake alert. vision good  no CSF rhinorrhea  jonathan-umbilical incision C & D    Labs pending  MRI pending  Tx to floor

## 2021-12-03 NOTE — PROGRESS NOTE ADULT - SUBJECTIVE AND OBJECTIVE BOX
ENT ISSUE/POD: POD #1 TSRP     HPI: 26F POD#1 from TRSP with fat graft in place, Pt doing well, slight h/a, relieved with pain meds. Pt denies any bleeding, no leaking, no salty or metallic taste in mouth, no PND.           PAST MEDICAL & SURGICAL HISTORY:  History of pituitary tumor  Benign    Chronic back pain  disc protrusions between L4-L5; Improved with physical therapy and exercises    Vitamin D deficiency    Heart palpitations  since 2018; echocardiogram ordered by endocrinologist - as per patient, results were normal    No significant past surgical history      Allergies    No Known Allergies    Intolerances      MEDICATIONS  (STANDING):  cabergoline 0.5 milliGRAM(s) Oral <User Schedule>  ceFAZolin   IVPB 2000 milliGRAM(s) IV Intermittent once  chlorhexidine 2% Cloths 1 Application(s) Topical once  cholecalciferol 2000 Unit(s) Oral daily  polyethylene glycol 3350 17 Gram(s) Oral two times a day  senna 2 Tablet(s) Oral at bedtime  sodium chloride 0.9% with potassium chloride 20 mEq/L 1000 milliLiter(s) (70 mL/Hr) IV Continuous <Continuous>    MEDICATIONS  (PRN):  acetaminophen     Tablet .. 650 milliGRAM(s) Oral every 6 hours PRN Temp greater or equal to 38C (100.4F), Mild Pain (1 - 3)  ondansetron Injectable 4 milliGRAM(s) IV Push every 6 hours PRN Nausea and/or Vomiting      Social History: see consult    Family history: see consult    ROS:   ENT: all negative except as noted in HPI   Pulm: denies SOB, cough, hemoptysis  Neuro: denies numbness/tingling, loss of sensation  Endo: denies heat/cold intolerance, excessive sweating      Vital Signs Last 24 Hrs  T(C): 36.8 (03 Dec 2021 02:00), Max: 36.8 (03 Dec 2021 02:00)  T(F): 98.2 (03 Dec 2021 02:00), Max: 98.2 (03 Dec 2021 02:00)  HR: 62 (03 Dec 2021 06:00) (56 - 71)  BP: 106/58 (03 Dec 2021 06:00) (9/57 - 122/75)  BP(mean): 68 (03 Dec 2021 06:00) (68 - 93)  RR: 16 (03 Dec 2021 06:00) (14 - 17)  SpO2: 98% (03 Dec 2021 06:00) (95% - 100%)                          10.5   15.42 )-----------( 229      ( 03 Dec 2021 01:12 )             32.3    12-03    139  |  107  |  8   ----------------------------<  124<H>  4.3   |  21<L>  |  0.52    Ca    8.4      03 Dec 2021 01:11  Phos  3.2     12-03  Mg     2.1     12-03         PHYSICAL EXAM:  Gen: NAD  Skin: No rashes, bruises, or lesions  Head: Normocephalic, Atraumatic  Face: no edema, erythema, or fluctuance. Parotid glands soft without mass  Eyes: no scleral injection  Nose: Nares bilaterally patent, minimal serosang d/c from left nare   Mouth: No Stridor / Drooling / Trismus.  Mucosa moist, tongue/uvula midline, oropharynx clear  Neck: Flat, supple, no lymphadenopathy, trachea midline, no masses  Lymphatic: No lymphadenopathy  Resp: breathing easily, no stridor  Neuro: facial nerve intact, no facial droop

## 2021-12-03 NOTE — OCCUPATIONAL THERAPY INITIAL EVALUATION ADULT - LIVES WITH, PROFILE
Apt, 0 steps to enter, +elevator-13th floor. (I) ADLs and functional transfers without AD/DME. (+) /significant other

## 2021-12-03 NOTE — OCCUPATIONAL THERAPY INITIAL EVALUATION ADULT - ASR WT BEARING STATUS EVAL
nasal precautions- avoid nasal trauma, heavy lifting and bending at waist./no weight-bearing restrictions

## 2021-12-03 NOTE — PROGRESS NOTE ADULT - ASSESSMENT
26 year old female with PMH vitamin D deficiency and 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     #Prolactinoma s/p transphenoidal resection  PRL levels 2600 -> 93 (in November 2021), preop. While on Cabergoline 2 mg twice a week  s/p transphenoidal pituitary tumor resection on 12/1/21 due to inadequate response to high dose Cabergoline (large tumor size and continued elevation in PRL levels)   AM Cortisol 5.1. PRL mildly elevated 30  MRI demonstrating post surgical changes, with enhancement that can demonstrate post surgical change vs. residual pituitary tissue  -Can discontinue Cabergoline at this time and monitor response, will f/u outpatient.   -DI Watch: Strict I+O and check sodium levels BID.   -If UOP > 250cc/hr x 2 consecutive hours or if >500 cc/hr x 1hr, or if serum sodium is > 145, check urine osm and can give dose of DDAVP x 1.    Discussed with Dr. Shah and primary team    Ophelia Appiah MD  Endocrine Fellow  Pager: [phone]/DEEPALI [phone]  Consults 9am-5pm: [phone]  After 5pm and weekends: [phone]

## 2021-12-03 NOTE — PROGRESS NOTE ADULT - SUBJECTIVE AND OBJECTIVE BOX
Patient seen and examined at bedside.    --Anticoagulation--    T(C): 36.8 (12-03-21 @ 02:00), Max: 36.8 (12-03-21 @ 02:00)  HR: 60 (12-03-21 @ 02:00) (60 - 78)  BP: 92/64 (12-03-21 @ 02:00) (92/64 - 122/75)  RR: 16 (12-03-21 @ 02:00) (14 - 17)  SpO2: 100% (12-03-21 @ 02:00) (95% - 100%)  Wt(kg): --    Exam:  AOx3, PERRL, EOMI, VFF, no facial, no drift, GREENBERG 5/5, SILT    Labs:                        10.5   15.42 )-----------( 229      ( 03 Dec 2021 01:12 )             32.3     12-03    139  |  107  |  8   ----------------------------<  124<H>  4.3   |  21<L>  |  0.52    Ca    8.4      03 Dec 2021 01:11  Phos  3.2     12-03  Mg     2.1     12-03

## 2021-12-04 ENCOUNTER — TRANSCRIPTION ENCOUNTER (OUTPATIENT)
Age: 26
End: 2021-12-04

## 2021-12-04 VITALS
DIASTOLIC BLOOD PRESSURE: 63 MMHG | OXYGEN SATURATION: 99 % | HEART RATE: 73 BPM | SYSTOLIC BLOOD PRESSURE: 96 MMHG | RESPIRATION RATE: 18 BRPM | TEMPERATURE: 98 F

## 2021-12-04 DIAGNOSIS — E27.40 UNSPECIFIED ADRENOCORTICAL INSUFFICIENCY: ICD-10-CM

## 2021-12-04 LAB
ANION GAP SERPL CALC-SCNC: 8 MMOL/L — SIGNIFICANT CHANGE UP (ref 5–17)
BUN SERPL-MCNC: 8 MG/DL — SIGNIFICANT CHANGE UP (ref 7–23)
CALCIUM SERPL-MCNC: 9.2 MG/DL — SIGNIFICANT CHANGE UP (ref 8.4–10.5)
CHLORIDE SERPL-SCNC: 108 MMOL/L — SIGNIFICANT CHANGE UP (ref 96–108)
CO2 SERPL-SCNC: 24 MMOL/L — SIGNIFICANT CHANGE UP (ref 22–31)
CORTIS AM PEAK SERPL-MCNC: 13.7 UG/DL — SIGNIFICANT CHANGE UP (ref 6–18.4)
CREAT SERPL-MCNC: 0.52 MG/DL — SIGNIFICANT CHANGE UP (ref 0.5–1.3)
GLUCOSE SERPL-MCNC: 96 MG/DL — SIGNIFICANT CHANGE UP (ref 70–99)
HCT VFR BLD CALC: 30.9 % — LOW (ref 34.5–45)
HGB BLD-MCNC: 10 G/DL — LOW (ref 11.5–15.5)
MCHC RBC-ENTMCNC: 29.6 PG — SIGNIFICANT CHANGE UP (ref 27–34)
MCHC RBC-ENTMCNC: 32.4 GM/DL — SIGNIFICANT CHANGE UP (ref 32–36)
MCV RBC AUTO: 91.4 FL — SIGNIFICANT CHANGE UP (ref 80–100)
NRBC # BLD: 0 /100 WBCS — SIGNIFICANT CHANGE UP (ref 0–0)
PLATELET # BLD AUTO: 201 K/UL — SIGNIFICANT CHANGE UP (ref 150–400)
POTASSIUM SERPL-MCNC: 4 MMOL/L — SIGNIFICANT CHANGE UP (ref 3.5–5.3)
POTASSIUM SERPL-SCNC: 4 MMOL/L — SIGNIFICANT CHANGE UP (ref 3.5–5.3)
PROCALCITONIN SERPL-MCNC: <0.03 NG/ML — SIGNIFICANT CHANGE UP (ref 0.02–0.1)
RBC # BLD: 3.38 M/UL — LOW (ref 3.8–5.2)
RBC # FLD: 13.2 % — SIGNIFICANT CHANGE UP (ref 10.3–14.5)
SODIUM SERPL-SCNC: 140 MMOL/L — SIGNIFICANT CHANGE UP (ref 135–145)
WBC # BLD: 10.16 K/UL — SIGNIFICANT CHANGE UP (ref 3.8–10.5)
WBC # FLD AUTO: 10.16 K/UL — SIGNIFICANT CHANGE UP (ref 3.8–10.5)

## 2021-12-04 PROCEDURE — 88342 IMHCHEM/IMCYTCHM 1ST ANTB: CPT

## 2021-12-04 PROCEDURE — 36415 COLL VENOUS BLD VENIPUNCTURE: CPT

## 2021-12-04 PROCEDURE — 85018 HEMOGLOBIN: CPT

## 2021-12-04 PROCEDURE — 97165 OT EVAL LOW COMPLEX 30 MIN: CPT

## 2021-12-04 PROCEDURE — 83735 ASSAY OF MAGNESIUM: CPT

## 2021-12-04 PROCEDURE — 97161 PT EVAL LOW COMPLEX 20 MIN: CPT

## 2021-12-04 PROCEDURE — 82565 ASSAY OF CREATININE: CPT

## 2021-12-04 PROCEDURE — 84146 ASSAY OF PROLACTIN: CPT

## 2021-12-04 PROCEDURE — 84295 ASSAY OF SERUM SODIUM: CPT

## 2021-12-04 PROCEDURE — 84145 PROCALCITONIN (PCT): CPT

## 2021-12-04 PROCEDURE — C1889: CPT

## 2021-12-04 PROCEDURE — 82803 BLOOD GASES ANY COMBINATION: CPT

## 2021-12-04 PROCEDURE — 82330 ASSAY OF CALCIUM: CPT

## 2021-12-04 PROCEDURE — A9585: CPT

## 2021-12-04 PROCEDURE — 82435 ASSAY OF BLOOD CHLORIDE: CPT

## 2021-12-04 PROCEDURE — 85027 COMPLETE CBC AUTOMATED: CPT

## 2021-12-04 PROCEDURE — 88360 TUMOR IMMUNOHISTOCHEM/MANUAL: CPT

## 2021-12-04 PROCEDURE — 88305 TISSUE EXAM BY PATHOLOGIST: CPT

## 2021-12-04 PROCEDURE — 82947 ASSAY GLUCOSE BLOOD QUANT: CPT

## 2021-12-04 PROCEDURE — 82533 TOTAL CORTISOL: CPT

## 2021-12-04 PROCEDURE — 88341 IMHCHEM/IMCYTCHM EA ADD ANTB: CPT

## 2021-12-04 PROCEDURE — 84132 ASSAY OF SERUM POTASSIUM: CPT

## 2021-12-04 PROCEDURE — 81025 URINE PREGNANCY TEST: CPT

## 2021-12-04 PROCEDURE — C1713: CPT

## 2021-12-04 PROCEDURE — 83605 ASSAY OF LACTIC ACID: CPT

## 2021-12-04 PROCEDURE — 99232 SBSQ HOSP IP/OBS MODERATE 35: CPT

## 2021-12-04 PROCEDURE — 80048 BASIC METABOLIC PNL TOTAL CA: CPT

## 2021-12-04 PROCEDURE — 85014 HEMATOCRIT: CPT

## 2021-12-04 PROCEDURE — 84100 ASSAY OF PHOSPHORUS: CPT

## 2021-12-04 PROCEDURE — C1769: CPT

## 2021-12-04 PROCEDURE — 70553 MRI BRAIN STEM W/O & W/DYE: CPT

## 2021-12-04 RX ORDER — POLYETHYLENE GLYCOL 3350 17 G/17G
17 POWDER, FOR SOLUTION ORAL
Qty: 0 | Refills: 0 | DISCHARGE
Start: 2021-12-04

## 2021-12-04 RX ORDER — SODIUM CHLORIDE 0.65 %
1 AEROSOL, SPRAY (ML) NASAL
Qty: 0 | Refills: 0 | DISCHARGE
Start: 2021-12-04

## 2021-12-04 RX ORDER — ACETAMINOPHEN 500 MG
2 TABLET ORAL
Qty: 0 | Refills: 0 | DISCHARGE
Start: 2021-12-04

## 2021-12-04 RX ORDER — CABERGOLINE 0.5 MG/1
4 TABLET ORAL
Qty: 0 | Refills: 0 | DISCHARGE

## 2021-12-04 RX ADMIN — POLYETHYLENE GLYCOL 3350 17 GRAM(S): 17 POWDER, FOR SOLUTION ORAL at 11:12

## 2021-12-04 RX ADMIN — Medication 2 SPRAY(S): at 11:12

## 2021-12-04 RX ADMIN — Medication 2 SPRAY(S): at 05:35

## 2021-12-04 RX ADMIN — Medication 2000 UNIT(S): at 11:11

## 2021-12-04 NOTE — PROGRESS NOTE ADULT - ASSESSMENT
27 y/o female with PMH vitamin D deficiency and heart palpitations (Dx in 2018 s/p echocardiogram WNL) who is now s/p TRSP with fat graft in place 12/2. Pt doing well, slight h/a, relieved with pain meds.    Plan:   - continue humidified face tent  - nasal saline, 2 sprays to b/l nares 4 times a day.  - monitor for signs of Epistaxis and CSF leak  - avoid nasal trauma, heavy lifting and bending at waist.  - Care a/p neurosurgery    Usha MIRANDA  ENT [id number] 27 y/o female with PMH vitamin D deficiency and heart palpitations (Dx in 2018 s/p echocardiogram WNL) who is now s/p TRSP with fat graft in place 12/2. Pt doing well, slight h/a, relieved with pain meds. She is doing well.    Plan:   - continue humidified face tent  - nasal saline, 2 sprays to b/l nares 4 times a day.  - monitor for signs of Epistaxis and CSF leak  - avoid nasal trauma, heavy lifting and bending at waist  -Neomed sinus rinse instructions given to patient at bedside for d/c  - Care a/p neurosurgery    Usha MIRANDA  ENT [id number]

## 2021-12-04 NOTE — PROGRESS NOTE ADULT - SUBJECTIVE AND OBJECTIVE BOX
ENT ISSUE/POD: s/p TSRP POD2    SUBJECTIVE: Pt seen and examined at bedside. There were no acute overnight events. The patient complains of a slight headache but otherwise is doing well. Her pain is controlled. She denies any salty/ metallic taste, no active nasal bleed, no leaking, no PND, CP, SOB, fevers, chills, N/V/D.      PAST MEDICAL & SURGICAL HISTORY:  History of pituitary tumor  Benign    Chronic back pain  disc protrusions between L4-L5; Improved with physical therapy and exercises    Vitamin D deficiency    Heart palpitations  since 2018; echocardiogram ordered by endocrinologist - as per patient, results were normal    No significant past surgical history      Allergies    No Known Allergies    Intolerances      MEDICATIONS  (STANDING):  ceFAZolin   IVPB 2000 milliGRAM(s) IV Intermittent once  chlorhexidine 2% Cloths 1 Application(s) Topical once  cholecalciferol 2000 Unit(s) Oral daily  polyethylene glycol 3350 17 Gram(s) Oral two times a day  senna 2 Tablet(s) Oral at bedtime  sodium chloride 0.65% Nasal 2 Spray(s) Both Nostrils four times a day    MEDICATIONS  (PRN):  acetaminophen     Tablet .. 650 milliGRAM(s) Oral every 6 hours PRN Temp greater or equal to 38C (100.4F), Mild Pain (1 - 3)  ondansetron Injectable 4 milliGRAM(s) IV Push every 6 hours PRN Nausea and/or Vomiting      Social History: see consult    Family history: see consult    ROS:   ENT: all negative except as noted in HPI   Pulm: denies SOB, cough, hemoptysis  Neuro: denies numbness/tingling, loss of sensation  Endo: denies heat/cold intolerance, excessive sweating      Vital Signs Last 24 Hrs  T(C): 36.5 (03 Dec 2021 23:30), Max: 37.3 (03 Dec 2021 12:33)  T(F): 97.7 (03 Dec 2021 23:30), Max: 99.1 (03 Dec 2021 12:33)  HR: 62 (03 Dec 2021 23:30) (62 - 84)  BP: 93/59 (03 Dec 2021 23:30) (93/59 - 113/67)  BP(mean): 68 (03 Dec 2021 10:50) (68 - 77)  RR: 18 (03 Dec 2021 23:30) (16 - 19)  SpO2: 100% (03 Dec 2021 23:30) (98% - 100%)                          10.0   10.16 )-----------( 201      ( 04 Dec 2021 06:03 )             30.9    12-03    140  |  109<H>  |  8   ----------------------------<  113<H>  4.1   |  22  |  0.53    Ca    8.5      03 Dec 2021 07:24  Phos  2.2     12-03  Mg     2.2     12-03         PHYSICAL EXAM:  Gen: NAD  Skin: No rashes, bruises, or lesions  Head: Normocephalic, Atraumatic  Face: no edema, erythema, or fluctuance. Parotid glands soft without mass  Eyes: no scleral injection  Nose: Nares bilaterally patent, minimal serosang d/c from left nare   Mouth: No Stridor / Drooling / Trismus.  Mucosa moist, tongue/uvula midline, oropharynx clear  Neck: Flat, supple, no lymphadenopathy, trachea midline, no masses  Lymphatic: No lymphadenopathy  Resp: breathing easily, no stridor  Neuro: facial nerve intact, no facial droop       ENT ISSUE/POD: s/p TSRP POD2    SUBJECTIVE: Pt seen and examined at bedside. There were no acute overnight events. The patient complains of a slight headache but otherwise is doing well. Her pain is controlled. She denies any salty/ metallic taste, no active nasal bleed, no leaking, no PND, CP, SOB, fevers, chills, N/V/D.      PAST MEDICAL & SURGICAL HISTORY:  History of pituitary tumor  Benign    Chronic back pain  disc protrusions between L4-L5; Improved with physical therapy and exercises    Vitamin D deficiency    Heart palpitations  since 2018; echocardiogram ordered by endocrinologist - as per patient, results were normal    No significant past surgical history      Allergies    No Known Allergies    Intolerances      MEDICATIONS  (STANDING):  ceFAZolin   IVPB 2000 milliGRAM(s) IV Intermittent once  chlorhexidine 2% Cloths 1 Application(s) Topical once  cholecalciferol 2000 Unit(s) Oral daily  polyethylene glycol 3350 17 Gram(s) Oral two times a day  senna 2 Tablet(s) Oral at bedtime  sodium chloride 0.65% Nasal 2 Spray(s) Both Nostrils four times a day    MEDICATIONS  (PRN):  acetaminophen     Tablet .. 650 milliGRAM(s) Oral every 6 hours PRN Temp greater or equal to 38C (100.4F), Mild Pain (1 - 3)  ondansetron Injectable 4 milliGRAM(s) IV Push every 6 hours PRN Nausea and/or Vomiting      Social History: see consult    Family history: see consult    ROS:   ENT: all negative except as noted in HPI   Pulm: denies SOB, cough, hemoptysis  Neuro: denies numbness/tingling, loss of sensation  Endo: denies heat/cold intolerance, excessive sweating      Vital Signs Last 24 Hrs  T(C): 36.5 (03 Dec 2021 23:30), Max: 37.3 (03 Dec 2021 12:33)  T(F): 97.7 (03 Dec 2021 23:30), Max: 99.1 (03 Dec 2021 12:33)  HR: 62 (03 Dec 2021 23:30) (62 - 84)  BP: 93/59 (03 Dec 2021 23:30) (93/59 - 113/67)  BP(mean): 68 (03 Dec 2021 10:50) (68 - 77)  RR: 18 (03 Dec 2021 23:30) (16 - 19)  SpO2: 100% (03 Dec 2021 23:30) (98% - 100%)                          10.0   10.16 )-----------( 201      ( 04 Dec 2021 06:03 )             30.9    12-03    140  |  109<H>  |  8   ----------------------------<  113<H>  4.1   |  22  |  0.53    Ca    8.5      03 Dec 2021 07:24  Phos  2.2     12-03  Mg     2.2     12-03         PHYSICAL EXAM:  Gen: NAD  Skin: No rashes, bruises, or lesions  Head: Normocephalic, Atraumatic  Face: no edema, erythema, or fluctuance. Parotid glands soft without mass  Eyes: no scleral injection  Nose: Nares bilaterally with blood tinged secretions, no active bleeding or secretions leaking from nares   Mouth: No Stridor / Drooling / Trismus.  Mucosa moist, tongue/uvula midline, oropharynx clear  Neck: Flat, supple, no lymphadenopathy, trachea midline, no masses  Lymphatic: No lymphadenopathy  Resp: breathing easily, no stridor  Neuro: facial nerve intact, no facial droop

## 2021-12-04 NOTE — DISCHARGE NOTE PROVIDER - CARE PROVIDERS DIRECT ADDRESSES
[email or web address]

## 2021-12-04 NOTE — PROGRESS NOTE ADULT - ASSESSMENT
26 year old female with PMH vitamin D deficiency and 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     #Prolactinoma s/p transphenoidal resection  PRL levels 2600 -> 93 (in November 2021), preop. While on Cabergoline 2 mg twice a week  s/p transphenoidal pituitary tumor resection on 12/1/21 due to inadequate response to high dose Cabergoline (large tumor size and continued elevation in PRL levels)   AM Cortisol 5.1 at 1pm on 12/03/21. PRL mildly elevated 30  MRI demonstrating post surgical changes, with enhancement that can demonstrate post surgical change vs. residual pituitary tissue  -Can discontinue Cabergoline at this time and monitor response, will f/u outpatient.   -DI Watch: Strict I+O and check sodium levels BID. No indication for DDAVP at this time  -If UOP > 250cc/hr x 2 consecutive hours or if >500 cc/hr x 1hr, or if serum sodium is > 145, check urine osm and can give dose of DDAVP x 1.  -Recommend check Cortisol prior to discharge, pending this morning. If Cortisol is less than 10, recommend Prednisone 5 mg QAM, and can be titrated down in the outpatient setting and HPA axis can be rechecked  -Surgical pathology is pending, unable to appreciate staining of hormones yet, follow-up outpatient  -Recommend outpatient labs for 12/08/21: AM Cortisol (if not on prednisone), FT4, BMP, serum osm, urine osm  -Advised patient to contact my office 24/7 incase of severe polyuria or polydipsia given risk of DI and need for DDAVP, [phone]  -Dr. Evans's RN to contact patient on 12/06/21 to inquire symptoms, informed RN    Will notify Dr. Evans and Dr. Murillo of plans  Discussed with PA/NP    *Please note a different provider maybe managing this patient tomorrow/daily*. Please contact our office at [phone] regarding follow-up queries about this patient. For any endocrine emergencies, please state urgency when calling [phone] during business hours and to speak with on-call fellow after 5pm and on weekends.    Smiley Ziegler DO  Pager: 9AM - 5PM (Mon-Fri): [phone]  After 5PM and on Weekends: [phone]

## 2021-12-04 NOTE — DISCHARGE NOTE PROVIDER - HOSPITAL COURSE
26 year old female with PMH vitamin D deficiency and heart palpitations (Dx in 2018 s/p echocardiogram WNL) reports c/o amenorrhea x 6 months back in October 2019 with blood work that revealed an elevated prolactin level s/p brain MRI revealed a pituitary tumor and pt was placed on Cabergoline medication. Pt denies any other neurologic symptoms and reports that her menstrual cycle has returned as a regular cycle since starting the medication with LMP 10/22/21. Pt now presents [facility] for scheduled endoscopic transphenoidal removal of a pituitary tumor on 12/2/21 with Dr. Murillo.    Covid PCR test scheduled for 11/29/21 at [facility]  Denies Recent travel, Exposure or Covid symptoms   Covid vaccine Pfizer 2nd dose received 1/30/2021; Booster received 10/2021 (18 Nov 2021 17:05)    Patient admitted and underwent on 12/2/21 endoscopic endonasal transphenoidal resection of pituitary tumor with ENT assistance; harvest of umbilical fat graft for CSF leak prophylaxis in setting of arachnoid bleb. She had routine post op care in [facility] and then transferred to the floor. No evidence of DI/CSF leak. Seen and followed by ENT and  endocrinology- who stopped cabergoline and will follow as outpatient. PT/OT evaluated her and recommended no skilled PT/OT needs. On the day of discharge she was medically and neurologically stable for discharge to home.

## 2021-12-04 NOTE — DISCHARGE NOTE PROVIDER - NSDCFUADDINST_GEN_ALL_CORE_FT
Please avoid nasal trauma, do not blow your nose, use straws or incentive spirometers. If you notice nasal drainage, post nasal drainage, or metallic/salty taste please notify your neurosurgeon and/or ENT.   please notify physician if fevers, bleeding, swelling, pain not relieved by medication, increased sluggishness or irritability, increased nausea or vomiting, inability to tolerate foods or liquids, new or increasing weakness/numbness/tingling.   please do not engage in strenuous activity, heavy lifting, drive, or return to work or school until cleared by surgeon.   please keep incision clean and dry, do not submerge wound in water for prolonged periods of time, pat dry after showering, and do not use any creams or ointments to incision.   Please do not take NSAIDs- ie. advil, motrin, ibuprofen, aleve, aspirin, naproxen, etc. Tylenol/ acetaminophen ok to take.

## 2021-12-04 NOTE — DISCHARGE NOTE PROVIDER - PROVIDER TOKENS
PROVIDER:[TOKEN:[[id number]]],PROVIDER:[TOKEN:[[id number]]],PROVIDER:[TOKEN:[[id number]]],PROVIDER:[TOKEN:[[id number]]]

## 2021-12-04 NOTE — PROGRESS NOTE ADULT - ASSESSMENT
HPI:  26 year old female with PMH vitamin D deficiency and heart palpitations (Dx in 2018 s/p echocardiogram WNL) reports c/o amenorrhea x 6 months back in October 2019 with blood work that revealed an elevated prolactin level s/p brain MRI revealed a pituitary tumor and pt was placed on Cabergoline medication. Pt denies any other neurologic symptoms and reports that her menstrual cycle has returned as a regular cycle since starting the medication with LMP 10/22/21. Pt now presents to [facility] for scheduled endoscopic transphenoidal removal of a pituitary tumor on 12/2/21 with Dr. Murillo.    Covid PCR test scheduled for 11/29/21 at [facility]  Denies Recent travel, Exposure or Covid symptoms   Covid vaccine Pfizer 2nd dose received 1/30/2021; Booster received 10/2021 (18 Nov 2021 17:05)    PAST MEDICAL & SURGICAL HISTORY:  History of pituitary tumor  Benign    Chronic back pain  disc protrusions between L4-L5; Improved with physical therapy and exercises    Vitamin D deficiency    Heart palpitations  since 2018; echocardiogram ordered by endocrinologist - as per patient, results were normal    No significant past surgical history    PROCEDURE: 12/2/21 s/p endoscopic endonasal transphenoidal resection of pituitary tumor; harvest of umbilical fat graft for CSF leak prophylaxis in setting of arachnoid bleb.     POD#4    PLAN:  Neuro: tylenol PRN pain, nasal precautions reviewed with patient. off cabergoline per endo with outpt f/u; no evidence of csf leak/ DI  Respiratory: patient instructed on incentive spirometer  CV: normotensive off meds  Endocrine: off cabergoline per endo               DVT ppx: [] SQH [x] SQL and venodynes bilaterally  Renal: IVL, monitor urine output   ID: afebrile  GI: bowel regimen  PT/OT: no skilled needs  will d/c IVL prior to d/c home  Discussed with patient wound care, follow up plans, activity, and medications. Questions answered, and she verbalized understanding.   no RXs needed    Will discuss with Dr Murillo  [facility] # [id number]

## 2021-12-04 NOTE — PROGRESS NOTE ADULT - SUBJECTIVE AND OBJECTIVE BOX
CHIEF COMPLAINT: patient reports mild headache relieved with tylenol; denies visual changes or salty/metallic taste or excessive thirst    OVERNIGHT EVENTS: transferred from [facility]    Vital Signs Last 24 Hrs  T(C): 36.8 (04 Dec 2021 07:55), Max: 37.3 (03 Dec 2021 12:33)  T(F): 98.3 (04 Dec 2021 07:55), Max: 99.1 (03 Dec 2021 12:33)  HR: 67 (04 Dec 2021 07:55) (62 - 84)  BP: 95/55 (04 Dec 2021 07:55) (93/59 - 113/67)  BP(mean): --  RR: 18 (04 Dec 2021 07:55) (16 - 19)  SpO2: 99% (04 Dec 2021 07:55) (98% - 100%)    I/Os- 100-200cc/h urine output    PHYSICAL EXAM:    General: No Acute Distress     Neurological: Awake, alert oriented to person, place and time, Following Commands, PERRL, EOMI, Face Symmetrical, Speech Fluent, Moving all extremities, Muscle Strength normal in all four extremities, No Drift, Sensation to Light Touch Intact    Pulmonary: Clear to Auscultation, No Rales, No Rhonchi, No Wheezes     Cardiovascular: S1, S2, Regular Rate and Rhythm     Gastrointestinal: Soft, Nontender, Nondistended     Incision: +prineo over umbilicus    LABS:                        10.0   10.16 )-----------( 201      ( 04 Dec 2021 06:03 )             30.9    12-04    140  |  108  |  8   ----------------------------<  96  4.0   |  24  |  0.52    Ca    9.2      04 Dec 2021 06:03  Phos  2.2     12-03  Mg     2.2     12-03 12-03 @ 07:01  -  12-04 @ 07:00  --------------------------------------------------------  IN: 2070 mL / OUT: 2370 mL / NET: -300 mL    12-04 @ 07:01  -  12-04 @ 11:15  --------------------------------------------------------  IN: 360 mL / OUT: 0 mL / NET: 360 mL    MEDICATIONS:    Antibiotics:  ceFAZolin   IVPB 2000 milliGRAM(s) IV Intermittent once    Neuro:  acetaminophen     Tablet .. 650 milliGRAM(s) Oral every 6 hours PRN Temp greater or equal to 38C (100.4F), Mild Pain (1 - 3)  ondansetron Injectable 4 milliGRAM(s) IV Push every 6 hours PRN Nausea and/or Vomiting    GI/:  polyethylene glycol 3350 17 Gram(s) Oral two times a day  senna 2 Tablet(s) Oral at bedtime    Other:   chlorhexidine 2% Cloths 1 Application(s) Topical once  cholecalciferol 2000 Unit(s) Oral daily  sodium chloride 0.65% Nasal 2 Spray(s) Both Nostrils four times a day    DIET: [x] Regular [] CCD [] Renal [] Puree [] Dysphagia [] Tube Feeds:     IMAGING:     < from: MR Head w/wo IV Cont (12.03.21 @ 09:43) >  INTERPRETATION:  Clinical indications: Status post transsphenoidal surgery for pituitary tumor.    MRI of the sella turcica was performed using coronal and sagittal T1-weighted sequence. Coronal T2-weighted sequence was performed as well. Sagittal T1, axial SPGR, T2 T2, FLAIR, diffusion and susceptibility weighted sequences were performed. The patient was injected with approximately 6 cc gadavist IV with 1.5 cc contrast discarded. Coronal and sagittal T1-weighted sequence performed through the sella turcica. Dynamic coronal T1-weighted sequence performed. Axial sagittal coronal T1-weighted sequence performed through the brain.    This exam is compared with prior MRI sella turcica performed on October 27, 2021.    Postoperative changes compatible transphenoidal surgery is now identified. There has been resection of the previously noted mass in the left sella turcica region. There is no evidence of [id number] identified in this region which is likely compatible area of fat packing. There is evidence of a area of T1 prolongation with peripheral enhancement seen just above the fat packing. This is best seen on series 14 image 11. This could be compatible with a postop collection though continued close interval follow-up is recommended to rule out residual area of tumor. This finding measures approximately 4.7 x 2.0 mm.    Enhancing soft tissue is seen involving the central to right-sided sella turcica. This is likely compatible with residual pituitary tissue. This finding measures approximately 1.1 cm maximum height.    The pituitary stalk is again deviated to the right side appears unremarkable    Evaluation of the surrounding sella structures including the optic chiasm and bilateral internal carotid arteries appear normal.    There is no evidence acute hemorrhage mass mass effect or abnormal enhancement in the posterior fossa or supratentorial region.    Evaluation of the diffusion weighted sequence demonstrates no abnormal areas of restricted diffusion to suggest acute infarct.    Nodular mucosal thickening is seen involving the left maxillary sinus.    Opacification of the sphenoid sinus is seen which is consistent with postopchanges.    Both mastoid and middle ear regions appear clear.      IMPRESSION: New postop changes as described above. There is evidence of fat packing seen in the left sella turcica (postop) region. Just above this fat packing there is area of T1 prolongation peripheral enhancement. This could be compatible with Postop changes though residual tumor cannot be entirely excluded. Clinical correlation and continued close interval is   recommended     < end of copied text >

## 2021-12-04 NOTE — DISCHARGE NOTE PROVIDER - REASON FOR ADMISSION
12/2/21 s/p endoscopic endonasal transphenoidal resection of pituitary tumor with ENT assistance; harvest of umbilical fat graft.

## 2021-12-04 NOTE — DISCHARGE NOTE NURSING/CASE MANAGEMENT/SOCIAL WORK - PATIENT PORTAL LINK FT
You can access the FollowMyHealth Patient Portal offered by [facility] by registering at the following website: [email or web address]. By joining [employer]’s FollowMyHealth portal, you will also be able to view your health information using other applications (apps) compatible with our system.

## 2021-12-04 NOTE — DISCHARGE NOTE PROVIDER - CARE PROVIDER_API CALL
Karan Murillo (MD)  Neurosurgery  [address], [address]  [place] [address]  Phone: [phone]  Fax: [phone]  Follow Up Time:     Vidhi Marie)  Otolaryngology  [address] [place] [address], [facility] [address]  [place] [address]  Phone: [phone]  Fax: [phone]  Follow Up Time:     Marvin Evans)  Internal Medicine  [address] [place] [address]  Phone: [phone]  Fax: [phone]  Follow Up Time:     Sherlyn Del Rosario)  Internal Medicine  [address] [place], [address] [place] [address]  Phone: [phone]  Fax: [phone]  Follow Up Time:

## 2021-12-04 NOTE — DISCHARGE NOTE PROVIDER - NSDCFUADDAPPT_GEN_ALL_CORE_FT
Please follow up in 1-2 weeks with:  1- Dr Murillo- neurosurgeon  2- Dr Marie- ENT  3- Dr Evans- endocrinology  4- Dr Del Rosario- PCP

## 2021-12-04 NOTE — PROGRESS NOTE ADULT - SUBJECTIVE AND OBJECTIVE BOX
Chief Complaint/Follow-up on: s/p resistant prolactinoma    Subjective:    Initially seen by neurosurgery outpatient in Sept 2021 with plans for TSR  MRI in October 2021 with slightly smaller pituitary adenoma, deviation of stalk, no optic compression  Prolactin level was 93 in Nov 2021    Seen by Dr. Evans in March 2021 in office:  Ms. TR MALONE is a 25 year old female with no significant PMH presents for follow up visit for new macroprolactinoma. Diagnosed in Oct 2019.     Patient reports first started to have symptoms when started to have irregular periods in 2016. Saw PCP in 2018. Had hormone levels checked at that time and reports prolactin was 800s at that time. Reports was told likely 2/2 possible PCOS and was being monitored.     Since then saw OBGYN and prolactin was rechecked and was 2600s. 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  MRI done in 2020, heterogenous area of enhancement is seen, has diminished since last imaging, 1.9x1.2 cm and previously measures approximately 2.3x1.9cm. She is currently on Cabergoline 0.5mg 3 tablets twice weekly (1.5mg twice weekly).       MEDICATIONS  (STANDING):  ceFAZolin   IVPB 2000 milliGRAM(s) IV Intermittent once  chlorhexidine 2% Cloths 1 Application(s) Topical once  cholecalciferol 2000 Unit(s) Oral daily  polyethylene glycol 3350 17 Gram(s) Oral two times a day  senna 2 Tablet(s) Oral at bedtime  sodium chloride 0.65% Nasal 2 Spray(s) Both Nostrils four times a day    MEDICATIONS  (PRN):  acetaminophen     Tablet .. 650 milliGRAM(s) Oral every 6 hours PRN Temp greater or equal to 38C (100.4F), Mild Pain (1 - 3)  ondansetron Injectable 4 milliGRAM(s) IV Push every 6 hours PRN Nausea and/or Vomiting      PHYSICAL EXAM:  VITALS: T(C): 36.8 (12-04-21 @ 07:55)  T(F): 98.3 (12-04-21 @ 07:55), Max: 99.1 (12-03-21 @ 12:33)  HR: 67 (12-04-21 @ 07:55) (62 - 84)  BP: 95/55 (12-04-21 @ 07:55) (93/59 - 113/67)  RR:  (16 - 19)  SpO2:  (98% - 100%)  Wt(kg): 58 kg    GENERAL: NAD, well-groomed, well-developed  EYES: No proptosis, no injection  HEENT:  Atraumatic, Normocephalic, moist mucous membranes  THYROID: Normal size, no palpable nodules  RESPIRATORY: Clear to auscultation bilaterally; No rales, rhonchi, wheezing, or rubs  CARDIOVASCULAR: Regular rate and rhythm; No murmurs; no peripheral edema  GI: Soft, nontender, non distended, normal bowel sounds  CUSHING'S SIGNS: no striae      12-04    140  |  108  |  8   ----------------------------<  96  4.0   |  24  |  0.52    EGFR if : 152  EGFR if non : 131    Ca    9.2      12-04  Mg     2.2     12-03  Phos  2.2     12-03    Prolactin, Serum (12.03.21 @ 13:14)   Prolactin, Serum: 30.3 ng/mL     Cortisol AM, Serum . (12.03.21 @ 13:14)   Cortisol AM, Serum: 5.1 ug/dL at 1pm    I&O's Detail    03 Dec 2021 07:01  -  04 Dec 2021 07:00  --------------------------------------------------------  IN:    Oral Fluid: 1720 mL    sodium chloride 0.9% w/ Additives: 350 mL  Total IN: 2070 mL    OUT:    Indwelling Catheter - Urethral (mL): 1050 mL    Voided (mL): 1320 mL  Total OUT: 2370 mL    Total NET: -300 mL      04 Dec 2021 07:01  -  04 Dec 2021 11:14  --------------------------------------------------------  IN:    Oral Fluid: 360 mL  Total IN: 360 mL    OUT:  Total OUT: 0 mL    Total NET: 360 mL               Chief Complaint/Follow-up on: s/p resistant prolactinoma    Subjective: Patient seen and examined, complaints of lightheadedness and states BP is usually borderline low. AM Cortisol today is pending. No other complaints, has mild headache. No polyuria or polydipsia.    Initially seen by neurosurgery outpatient in Sept 2021 with plans for TSR  MRI in October 2021 with slightly smaller pituitary adenoma, deviation of stalk, no optic compression  Prolactin level was 93 in Nov 2021    Seen by Dr. Evans in March 2021 in office:  Ms. TR MALONE is a 25 year old female with no significant PMH presents for follow up visit for new macroprolactinoma. Diagnosed in Oct 2019.     Patient reports first started to have symptoms when started to have irregular periods in 2016. Saw PCP in 2018. Had hormone levels checked at that time and reports prolactin was 800s at that time. Reports was told likely 2/2 possible PCOS and was being monitored.     Since then saw OBGYN and prolactin was rechecked and was 2600s. 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  MRI done in 2020, heterogenous area of enhancement is seen, has diminished since last imaging, 1.9x1.2 cm and previously measures approximately 2.3x1.9cm. She is currently on Cabergoline 0.5mg 3 tablets twice weekly (1.5mg twice weekly).       MEDICATIONS  (STANDING):  ceFAZolin   IVPB 2000 milliGRAM(s) IV Intermittent once  chlorhexidine 2% Cloths 1 Application(s) Topical once  cholecalciferol 2000 Unit(s) Oral daily  polyethylene glycol 3350 17 Gram(s) Oral two times a day  senna 2 Tablet(s) Oral at bedtime  sodium chloride 0.65% Nasal 2 Spray(s) Both Nostrils four times a day    MEDICATIONS  (PRN):  acetaminophen     Tablet .. 650 milliGRAM(s) Oral every 6 hours PRN Temp greater or equal to 38C (100.4F), Mild Pain (1 - 3)  ondansetron Injectable 4 milliGRAM(s) IV Push every 6 hours PRN Nausea and/or Vomiting      PHYSICAL EXAM:  VITALS: T(C): 36.8 (12-04-21 @ 07:55)  T(F): 98.3 (12-04-21 @ 07:55), Max: 99.1 (12-03-21 @ 12:33)  HR: 67 (12-04-21 @ 07:55) (62 - 84)  BP: 95/55 (12-04-21 @ 07:55) (93/59 - 113/67)  RR:  (16 - 19)  SpO2:  (98% - 100%)  Wt(kg): 58 kg    GENERAL: NAD, well-groomed, well-developed  EYES: No proptosis, no injection  HEENT:  Atraumatic, Normocephalic, moist mucous membranes  THYROID: Normal size, no palpable nodules  RESPIRATORY: Clear to auscultation bilaterally; No rales, rhonchi, wheezing, or rubs  CARDIOVASCULAR: Regular rate and rhythm; No murmurs; no peripheral edema  GI: Soft, nontender, non distended, normal bowel sounds  CUSHING'S SIGNS: no striae      12-04    140  |  108  |  8   ----------------------------<  96  4.0   |  24  |  0.52    EGFR if : 152  EGFR if non : 131    Ca    9.2      12-04  Mg     2.2     12-03  Phos  2.2     12-03    Prolactin, Serum (12.03.21 @ 13:14)   Prolactin, Serum: 30.3 ng/mL     Cortisol AM, Serum . (12.03.21 @ 13:14)   Cortisol AM, Serum: 5.1 ug/dL at 1pm    I&O's Detail    03 Dec 2021 07:01  -  04 Dec 2021 07:00  --------------------------------------------------------  IN:    Oral Fluid: 1720 mL    sodium chloride 0.9% w/ Additives: 350 mL  Total IN: 2070 mL    OUT:    Indwelling Catheter - Urethral (mL): 1050 mL    Voided (mL): 1320 mL  Total OUT: 2370 mL    Total NET: -300 mL      04 Dec 2021 07:01  -  04 Dec 2021 11:14  --------------------------------------------------------  IN:    Oral Fluid: 360 mL  Total IN: 360 mL    OUT:  Total OUT: 0 mL    Total NET: 360 mL

## 2021-12-04 NOTE — PROGRESS NOTE ADULT - ATTENDING COMMENTS
Agree with above.
Agree with assessment and plan as above by Dr. Appiah. Reviewed all pertinent labs, glucose values, and imaging studies. Modifications made as indicated above. Surgical resection successful as per discussion with Dr. Murillo. Therefore will give a trial off cabergoline with close outpatient follow-up. If prolactin starts to increase would restart. Monitor for DI given increase in urine output recently. Follow-up cortisol and Free T4 level post-op. Follow-up pathology.     Toi Shah D.O  [phone]
Patient seen, examined and case d/w fellow.
Pt seen and examined. Doing well  Prl yest 30  no csf rhinorhea  D/c home today off of dostinex
doing well no salty taste. c/w current care  nasal saline tid
Agree with/edited as appropriate above

## 2021-12-04 NOTE — DISCHARGE NOTE PROVIDER - NSDCCPCAREPLAN_GEN_ALL_CORE_FT
PRINCIPAL DISCHARGE DIAGNOSIS  Diagnosis: Benign tumor of pituitary gland  Assessment and Plan of Treatment: 12/2/21 s/p endoscopic endonasal transphenoidal resection of pituitary tumor with ENT assistance; harvest of umbilical fat graft.   Dr Murillo, Dr Marie, and Dr Evans within 1-2 weeks.   No nasal trauma.      SECONDARY DISCHARGE DIAGNOSES  Diagnosis: Prolactinoma  Assessment and Plan of Treatment: Please stop cabergoline as instructed and follow up with Dr Evans- endocrinologist within 1-2 weeks.

## 2021-12-04 NOTE — DISCHARGE NOTE PROVIDER - NSDCMRMEDTOKEN_GEN_ALL_CORE_FT
acetaminophen 325 mg oral tablet: 2-3  tab(s) orally every 6 hours, As needed, Temp greater or equal to 38C (100.4F), Mild Pain (1 - 3)  polyethylene glycol 3350 oral powder for reconstitution: 17 gram(s) orally 2 times a day  sodium chloride 0.65% nasal spray: 1 spray(s) nasal every 6 hours  or Colin Med nasal wash per ENT.   Vitamin D3 50 mcg (2000 intl units) oral tablet: 1 tab(s) orally once a day

## 2021-12-04 NOTE — PROGRESS NOTE ADULT - REASON FOR ADMISSION
12/2/21 s/p endoscopic endonasal transphenoidal resection of pituitary tumor; harvest of umbilical fat graft for CSF leak prophylaxis in setting of arachnoid bleb for cabergoline resistant pituitary tumor.

## 2021-12-04 NOTE — DISCHARGE NOTE NURSING/CASE MANAGEMENT/SOCIAL WORK - NSDCPEFALRISK_GEN_ALL_CORE
For information on Fall & Injury Prevention, visit: [email or web address] OR  [email or web address] OR  https://www.cdc.gov/steadi/patient.html

## 2021-12-06 ENCOUNTER — NON-APPOINTMENT (OUTPATIENT)
Age: 26
End: 2021-12-06

## 2021-12-06 LAB — SURGICAL PATHOLOGY STUDY: SIGNIFICANT CHANGE UP

## 2021-12-07 ENCOUNTER — EMERGENCY (EMERGENCY)
Facility: HOSPITAL | Age: 26
LOS: 1 days | Discharge: HOME | End: 2021-12-07
Attending: EMERGENCY MEDICINE
Payer: COMMERCIAL

## 2021-12-07 VITALS
OXYGEN SATURATION: 99 % | HEIGHT: 65 IN | HEART RATE: 82 BPM | RESPIRATION RATE: 16 BRPM | WEIGHT: 130.07 LBS | SYSTOLIC BLOOD PRESSURE: 110 MMHG | DIASTOLIC BLOOD PRESSURE: 77 MMHG

## 2021-12-07 LAB
ALBUMIN SERPL ELPH-MCNC: 4.5 G/DL — SIGNIFICANT CHANGE UP (ref 3.3–5)
ALP SERPL-CCNC: 70 U/L — SIGNIFICANT CHANGE UP (ref 40–120)
ALT FLD-CCNC: 9 U/L — LOW (ref 10–45)
ANION GAP SERPL CALC-SCNC: 11 MMOL/L — SIGNIFICANT CHANGE UP (ref 5–17)
AST SERPL-CCNC: 27 U/L — SIGNIFICANT CHANGE UP (ref 10–40)
BASOPHILS # BLD AUTO: 0.04 K/UL — SIGNIFICANT CHANGE UP (ref 0–0.2)
BASOPHILS NFR BLD AUTO: 0.3 % — SIGNIFICANT CHANGE UP (ref 0–2)
BILIRUB SERPL-MCNC: 0.5 MG/DL — SIGNIFICANT CHANGE UP (ref 0.2–1.2)
BUN SERPL-MCNC: 7 MG/DL — SIGNIFICANT CHANGE UP (ref 7–23)
CALCIUM SERPL-MCNC: 9.4 MG/DL — SIGNIFICANT CHANGE UP (ref 8.4–10.5)
CHLORIDE SERPL-SCNC: 98 MMOL/L — SIGNIFICANT CHANGE UP (ref 96–108)
CO2 SERPL-SCNC: 25 MMOL/L — SIGNIFICANT CHANGE UP (ref 22–31)
CREAT SERPL-MCNC: 0.53 MG/DL — SIGNIFICANT CHANGE UP (ref 0.5–1.3)
EOSINOPHIL # BLD AUTO: 0.04 K/UL — SIGNIFICANT CHANGE UP (ref 0–0.5)
EOSINOPHIL NFR BLD AUTO: 0.3 % — SIGNIFICANT CHANGE UP (ref 0–6)
GLUCOSE SERPL-MCNC: 121 MG/DL — HIGH (ref 70–99)
HCT VFR BLD CALC: 34.6 % — SIGNIFICANT CHANGE UP (ref 34.5–45)
HGB BLD-MCNC: 11.5 G/DL — SIGNIFICANT CHANGE UP (ref 11.5–15.5)
IMM GRANULOCYTES NFR BLD AUTO: 0.3 % — SIGNIFICANT CHANGE UP (ref 0–1.5)
LYMPHOCYTES # BLD AUTO: 1.77 K/UL — SIGNIFICANT CHANGE UP (ref 1–3.3)
LYMPHOCYTES # BLD AUTO: 13.3 % — SIGNIFICANT CHANGE UP (ref 13–44)
MCHC RBC-ENTMCNC: 29.6 PG — SIGNIFICANT CHANGE UP (ref 27–34)
MCHC RBC-ENTMCNC: 33.2 GM/DL — SIGNIFICANT CHANGE UP (ref 32–36)
MCV RBC AUTO: 89.2 FL — SIGNIFICANT CHANGE UP (ref 80–100)
MONOCYTES # BLD AUTO: 0.66 K/UL — SIGNIFICANT CHANGE UP (ref 0–0.9)
MONOCYTES NFR BLD AUTO: 5 % — SIGNIFICANT CHANGE UP (ref 2–14)
NEUTROPHILS # BLD AUTO: 10.77 K/UL — HIGH (ref 1.8–7.4)
NEUTROPHILS NFR BLD AUTO: 80.8 % — HIGH (ref 43–77)
NRBC # BLD: 0 /100 WBCS — SIGNIFICANT CHANGE UP (ref 0–0)
PLATELET # BLD AUTO: 253 K/UL — SIGNIFICANT CHANGE UP (ref 150–400)
POTASSIUM SERPL-MCNC: 3.9 MMOL/L — SIGNIFICANT CHANGE UP (ref 3.5–5.3)
POTASSIUM SERPL-SCNC: 3.9 MMOL/L — SIGNIFICANT CHANGE UP (ref 3.5–5.3)
PROT SERPL-MCNC: 8 G/DL — SIGNIFICANT CHANGE UP (ref 6–8.3)
RBC # BLD: 3.88 M/UL — SIGNIFICANT CHANGE UP (ref 3.8–5.2)
RBC # FLD: 12.7 % — SIGNIFICANT CHANGE UP (ref 10.3–14.5)
SODIUM SERPL-SCNC: 134 MMOL/L — LOW (ref 135–145)
WBC # BLD: 13.32 K/UL — HIGH (ref 3.8–10.5)
WBC # FLD AUTO: 13.32 K/UL — HIGH (ref 3.8–10.5)

## 2021-12-07 PROCEDURE — 99285 EMERGENCY DEPT VISIT HI MDM: CPT

## 2021-12-07 PROCEDURE — 93010 ELECTROCARDIOGRAM REPORT: CPT

## 2021-12-07 PROCEDURE — 70450 CT HEAD/BRAIN W/O DYE: CPT | Mod: 26,MA

## 2021-12-07 RX ORDER — ONDANSETRON 8 MG/1
4 TABLET, FILM COATED ORAL ONCE
Refills: 0 | Status: COMPLETED | OUTPATIENT
Start: 2021-12-07 | End: 2021-12-07

## 2021-12-07 RX ORDER — SODIUM CHLORIDE 9 MG/ML
1000 INJECTION INTRAMUSCULAR; INTRAVENOUS; SUBCUTANEOUS ONCE
Refills: 0 | Status: COMPLETED | OUTPATIENT
Start: 2021-12-07 | End: 2021-12-07

## 2021-12-07 RX ADMIN — SODIUM CHLORIDE 1000 MILLILITER(S): 9 INJECTION INTRAMUSCULAR; INTRAVENOUS; SUBCUTANEOUS at 23:06

## 2021-12-07 RX ADMIN — ONDANSETRON 4 MILLIGRAM(S): 8 TABLET, FILM COATED ORAL at 23:06

## 2021-12-07 RX ADMIN — ONDANSETRON 4 MILLIGRAM(S): 8 TABLET, FILM COATED ORAL at 20:09

## 2021-12-07 NOTE — ED PROVIDER NOTE - ATTENDING CONTRIBUTION TO CARE
Attending MD Blackmon:   I personally have seen and examined this patient.  Physician assistant note reviewed and agree on plan of care and except where noted.  See below for details.     Seen in [place] 65    26F with PMH/PSH including pituitary tumor s/p endonasal transphenoidal resection of pituitary tumor; harvest of umbilical fat graft for CSF leak prophylaxis in setting of arachnoid bleb (12/2/21 Dr. Murillo) presents to the ED with nausea, vomiting, headache.  Reports that she developed headache yesterday mildly improved with Tylenol.  Reports 1gm of Tylenol only provides about 4 hours of relief.  Reports headache worse with movement.  Reports headache persistent today, called Dr. Murillo's office and was instructed by NP Rx Zofran, Tramadol.  Reports had multiple emetic episodes this AM prior to Tramadol, Zofran.  Reports presents now with persistent headache, nausea.  Denies change in vision, double vision, sudden loss of vision. Denies rhinorrhea, epistaxis.  Reports has been keeping head elevated.  Denies numbness, weakness or tingling in extremities. Denies fevers, chills, dizziness, weakness. Denies chest pain, shortness of breath, palpitations. Denies abdominal pain, diarrhea. Denies pain at umbilicus at site of graft.  Denies urinary complaints.  Denies loss of urinary or bowel continence.  A ten (10) point review of systems was negative other than as stated in the HPI or elsewhere in the chart.     Exam:   General: NAD  HENT: head NCAT, airway patent with moist mucous membranes  Eyes: PERRL, EOMI, no conjunctival injection  Lungs: lungs CTAB with good inspiratory effort, no wheezing, no rhonchi, no rales  Cardiac: +S1S2, no m/r/g  GI: abdomen soft with +BS, NT, ND, umbilicus with dermabond healing incision, C/D/I  MSK: FROM at neck, no tenderness to midline palpation, no stepoffs along length of spine, no calf tenderness, swelling, erythema or warmth  Neuro: moving all extremities spontaneously with 5/5 strength, sensory grossly intact, no gross neuro deficits  Psych: normal mood and affect     A/P: 26F with recent transphenoidal resection of pituitary tumor here with headache and nausea, vomiting, CT head from [facility] with post operative changes, WBC noted, discussed with neurosx, will come evaluate, will redose antiemetic and give IVFs, reassess

## 2021-12-07 NOTE — ED ADULT NURSE NOTE - OBJECTIVE STATEMENT
27y/o female walked into ED a&ox4 c/o N/V. Patient reports she had pituitary tumor removed on Thursday. States yesterday she developed worsening headache along with nausea. Reports about 6 episodes of vomiting today, unable to tolerate PO. Coming to ED stating that she is worried about the pressure in her head due to vomiting. Denies blurry or double vision, blood in vomit, dizziness, weakness, abd pain, sob, fever or any other complaints. Neuro intact. MD Blackmon at bedside for eval. Pending neurosurg eval.

## 2021-12-07 NOTE — ED PROVIDER NOTE - PROGRESS NOTE DETAILS
Attending MD Blackmon: Seen by ENT and Neurosx, no acute intervention at this time, will give meds, reassess Attending Sawyer:  pt cleared to go home by ns, no fevers, no neck pain ha gone, pt does still have some mild nausea. Pt wants to go home, will po challenge first Attending Masom:  tolerated po, feeling better, will dc

## 2021-12-07 NOTE — ED PROVIDER NOTE - PATIENT PORTAL LINK FT
You can access the FollowMyHealth Patient Portal offered by [facility] by registering at the following website: [email or web address]. By joining [employer]’s FollowMyHealth portal, you will also be able to view your health information using other applications (apps) compatible with our system.

## 2021-12-07 NOTE — ED PROVIDER NOTE - RAPID ASSESSMENT
26y F presents to ED c/o intermittent throbbing bl temporal headache s/p pituitary tumor removed on 12/2. Had been taking Tylenol w/o relief. Pain worsened today and now a/w nausea and vomiting. Today NP Rx'd Zofran and Tramadol with no improvement. Denies fever. Last had head imaging on Dec 3.     Patient was seen as a tele QDOC patient. The patient will be seen and further worked up in the main emergency department and their care will be completed by the main emergency department team along with a thorough physical exam. Receiving team will follow up on labs, analgesia, any clinical imaging, reassess and disposition as clinically indicated, all decisions regarding the progression of care will be made at their discretion.    Scribe Statement: I, Dhara Vera, attest that this documentation has been prepared under the direction and in the presence of Benito Callejas) 26y F presents to ED c/o intermittent throbbing bl temporal headache s/p pituitary tumor removed on 12/2. Had been taking Tylenol w/o relief. Pain worsened today and now a/w nausea and vomiting. Today NP Rx'd Zofran and Tramadol with no improvement. Denies fever. Last had head imaging on Dec 3.     Patient was seen as a tele QDOC patient. The patient will be seen and further worked up in the main emergency department and their care will be completed by the main emergency department team along with a thorough physical exam. Receiving team will follow up on labs, analgesia, any clinical imaging, reassess and disposition as clinically indicated, all decisions regarding the progression of care will be made at their discretion.    Scribe Statement: I, Dhara Vera, attest that this documentation has been prepared under the direction and in the presence of Benito Callejas (MD)    I, Dr. Callejas, personally performed the service described in the documentation recorded by the scribe in my presence, and it accurately and completely records my words and actions.

## 2021-12-07 NOTE — ED ADULT TRIAGE NOTE - CHIEF COMPLAINT QUOTE
Had pituitary tumor removed last Thursday. Patient has been having an intermittent throbbing headache since the surgery with no relief from Tylenol, worsening today. Nausea and vomiting today.

## 2021-12-07 NOTE — ED PROVIDER NOTE - OBJECTIVE STATEMENT
27 yo female PMHx pituitary tumor s/p endoscopic endonasal transphenoidal resection w/ harvest of umbilical fat graft for CSF leak prophylaxis in setting of arachnoid bleb on 12/2/21 w/ Dr. Murillo presents to the ED c/o bitemporal HA that began yesterday w/ associated nausea and vomiting. When HA first started was taking 1g of Tylenol every 6 hours w about 4 hours of relief. This AM woke up w/ worsened HA, unrelieved w/ tylenol and severe nausea ad multiple episodes nb/nb vomiting. Called Dr. Murillo office, NP prescribed zofran and tramadol which only provided several hours of relief and symptoms recurred so came to ED. Denies cp, sob, speech/visual changes, numbness/tingling, photophobia, neck pain/stiffness, dizziness, weakness.

## 2021-12-08 VITALS
HEART RATE: 74 BPM | RESPIRATION RATE: 18 BRPM | DIASTOLIC BLOOD PRESSURE: 60 MMHG | SYSTOLIC BLOOD PRESSURE: 97 MMHG | OXYGEN SATURATION: 97 % | TEMPERATURE: 98 F

## 2021-12-08 DIAGNOSIS — R51.9 HEADACHE, UNSPECIFIED: ICD-10-CM

## 2021-12-08 LAB — PROLACTIN SERPL-MCNC: 43 NG/ML — HIGH (ref 3.4–24.1)

## 2021-12-08 PROCEDURE — 99284 EMERGENCY DEPT VISIT MOD MDM: CPT | Mod: 25

## 2021-12-08 PROCEDURE — 82533 TOTAL CORTISOL: CPT

## 2021-12-08 PROCEDURE — 93005 ELECTROCARDIOGRAM TRACING: CPT

## 2021-12-08 PROCEDURE — 70450 CT HEAD/BRAIN W/O DYE: CPT | Mod: MA

## 2021-12-08 PROCEDURE — 84146 ASSAY OF PROLACTIN: CPT

## 2021-12-08 PROCEDURE — 96374 THER/PROPH/DIAG INJ IV PUSH: CPT

## 2021-12-08 PROCEDURE — 80053 COMPREHEN METABOLIC PANEL: CPT

## 2021-12-08 PROCEDURE — 85025 COMPLETE CBC W/AUTO DIFF WBC: CPT

## 2021-12-08 PROCEDURE — 96375 TX/PRO/DX INJ NEW DRUG ADDON: CPT

## 2021-12-08 RX ORDER — ACETAMINOPHEN 500 MG
1000 TABLET ORAL ONCE
Refills: 0 | Status: COMPLETED | OUTPATIENT
Start: 2021-12-08 | End: 2021-12-08

## 2021-12-08 RX ORDER — METOCLOPRAMIDE HCL 10 MG
10 TABLET ORAL ONCE
Refills: 0 | Status: COMPLETED | OUTPATIENT
Start: 2021-12-08 | End: 2021-12-08

## 2021-12-08 RX ORDER — SODIUM CHLORIDE 9 MG/ML
1000 INJECTION INTRAMUSCULAR; INTRAVENOUS; SUBCUTANEOUS ONCE
Refills: 0 | Status: COMPLETED | OUTPATIENT
Start: 2021-12-08 | End: 2021-12-08

## 2021-12-08 RX ADMIN — Medication 10 MILLIGRAM(S): at 00:33

## 2021-12-08 RX ADMIN — SODIUM CHLORIDE 1000 MILLILITER(S): 9 INJECTION INTRAMUSCULAR; INTRAVENOUS; SUBCUTANEOUS at 00:32

## 2021-12-08 RX ADMIN — Medication 400 MILLIGRAM(S): at 00:32

## 2021-12-08 NOTE — CONSULT NOTE ADULT - SUBJECTIVE AND OBJECTIVE BOX
CC: h/a n/v     HPI:  26F with PMH/PSH including pituitary tumor s/p endonasal transphenoidal resection of pituitary tumor; harvest of umbilical fat graft for CSF leak prophylaxis in setting of arachnoid bleb (12/2/21 Dr. Murillo) presents to the ED with nausea, vomiting, headache.  Reports that she developed headache yesterday mildly improved with Tylenol.  Reports 1gm of Tylenol only provides about 4 hours of relief.  Reports headache worse with movement.  Reports headache persistent today, called Dr. Murillo's office and was instructed by NP Rx Zofran, Tramadol.  Reports had multiple emetic episodes this AM prior to Tramadol, Zofran.  Reports presents now with persistent headache, nausea.  Denies change in vision, double vision, sudden loss of vision. Denies rhinorrhea, epistaxis.  Reports has been keeping head elevated.     PAST MEDICAL & SURGICAL HISTORY:  History of pituitary tumor  Benign    Chronic back pain  disc protrusions between L4-L5; Improved with physical therapy and exercises    Vitamin D deficiency    Heart palpitations  since 2018; echocardiogram ordered by endocrinologist - as per patient, results were normal    No significant past surgical history      Allergies    No Known Allergies    Intolerances      MEDICATIONS  (STANDING):    MEDICATIONS  (PRN):      Social History: no tobacco, no etoh     Family history: Pt denies any sign FHx    ROS:   ENT: all negative except as noted in HPI   CV: denies palpitations  Pulm: denies SOB, cough, hemoptysis  GI: denies change in apetite, indigestion, n/v  : denies pertinent urinary symptoms, urgency  Neuro: denies numbness/tingling, loss of sensation  Psych: denies anxiety  MS: denies muscle weakness, instability  Heme: denies easy bruising or bleeding  Endo: denies heat/cold intolerance, excessive sweating  Vascular: denies LE edema    Vital Signs Last 24 Hrs  T(C): 36.7 (07 Dec 2021 23:05), Max: 36.7 (07 Dec 2021 23:05)  T(F): 98.1 (07 Dec 2021 23:05), Max: 98.1 (07 Dec 2021 23:05)  HR: 70 (07 Dec 2021 23:05) (70 - 82)  BP: 103/70 (07 Dec 2021 23:05) (103/70 - 110/77)  BP(mean): --  RR: 16 (07 Dec 2021 23:05) (16 - 16)  SpO2: 100% (07 Dec 2021 23:05) (99% - 100%)                          11.5   13.32 )-----------( 253      ( 07 Dec 2021 20:45 )             34.6    12-07    134<L>  |  98  |  7   ----------------------------<  121<H>  3.9   |  25  |  0.53    Ca    9.4      07 Dec 2021 20:45    TPro  8.0  /  Alb  4.5  /  TBili  0.5  /  DBili  x   /  AST  27  /  ALT  9<L>  /  AlkPhos  70  12-07       PHYSICAL EXAM:  Gen: NAD  Skin: No rashes, bruises, or lesions  Head: Normocephalic, Atraumatic  Face: no edema, erythema, or fluctuance. Parotid glands soft without mass  Eyes: no scleral injection  Nose: Nares bilaterally patent, no discharge  Mouth: No Stridor / Drooling / Trismus.  Mucosa moist, tongue/uvula midline, oropharynx clear  Neck: Flat, supple, no lymphadenopathy, trachea midline, no masses  Lymphatic: No lymphadenopathy  Resp: breathing easily, no stridor  CV: no peripheral edema/cyanosis  GI: nondistended   Peripheral vascular: no JVD or edema  Neuro: facial nerve intact, no facial droop

## 2021-12-08 NOTE — CONSULT NOTE ADULT - ASSESSMENT
TR MALONE  26F s/p TSP for prolactinoma w/ [facility] 12/2/21, dced 12/4 w/ stable pit labs still on cabergoline, has had persistent non-positional bitemporal HA since dc, this AM w/ nausea/vomiting. [facility]'s office prescribed zofran/toradol, however still nauseas. CTH stable postop changes, no clinical e/o CSF leak (also evaled by ENT), and afebrile w/ mild leukocytosis 13. Exam: Neuro intact, no neck stiffness, neg kernigs sign.   - no acute nsgy intervention or c/i to discharge if sxs improve  - agree w/ ED--pain mgmt, reglan, fluids. Can continue outpt Rx for toradol and zofran as needed  - Has appointment with [facility] on 12/16, should fu labs and continue to take temperature at home. RTED for any new fever.    TR MALONE  26F s/p TSP for prolactinoma w/ [facility] 12/2/21, dced 12/4 w/ stable pit labs still on cabergoline, has had persistent non-positional bitemporal HA since dc, this AM w/ nausea/vomiting. [facility]'s office prescribed zofran/toradol, however still nauseas. CTH stable postop changes, no clinical e/o CSF leak (also evaled by ENT), and afebrile w/ mild leukocytosis 13. Exam: Neuro intact, no neck stiffness, neg kernigs sign.   - no acute nsgy intervention or c/i to discharge if sxs improve  - agree w/ ED--pain mgmt, reglan, fluids. Can continue outpt Rx for tramadol and zofran as needed  - Has appointment with [facility] on 12/16, should fu labs and continue to take temperature at home. RTED for any new fever.    TR MALONE  26F s/p TSP for prolactinoma w/ [facility] 12/2/21, dced 12/4 w/ stable pit labs still on cabergoline, has had persistent non-positional bitemporal HA since dc, this AM w/ nausea/vomiting. [facility]'s office prescribed zofran/tramadol, however still nauseas. CTH stable postop changes, no clinical e/o CSF leak (also evaled by ENT), and afebrile w/ mild leukocytosis 13. Exam: Neuro intact, no neck stiffness, neg kernigs sign.   - no acute nsgy intervention or c/i to discharge if sxs improve  - agree w/ ED--pain mgmt, reglan, fluids. Can continue outpt Rx for tramadol and zofran as needed  - Has appointment with [facility] on 12/16, should fu labs and continue to take temperature at home. RTED for any new fever.

## 2021-12-08 NOTE — CONSULT NOTE ADULT - PROBLEM SELECTOR RECOMMENDATION 9
f/u with Neuro for recs  will continue to follow   call ent prn   continue to monitor for bleed/rhinorrhea

## 2021-12-08 NOTE — CONSULT NOTE ADULT - ASSESSMENT
26y s/p tsr arachnoid bleb 12/2 presenting with n/v and h/a. Pt without any epistaxis or rhinorrhea

## 2021-12-08 NOTE — ED POST DISCHARGE NOTE - ADDITIONAL DOCUMENTATION
12/8/21: Discussed level w/ neurosurgery. No change in management based on level, advised pt to f/u with Dr. Murillo as previously advised.

## 2021-12-08 NOTE — ED POST DISCHARGE NOTE - OTHER COMMUNICATION
12/16: Free Cortisol elevated in setting of  endoscopic endonasal transphenoidal resection of pituitary tumor on 12/2. Pt with NSX follow up today and Endo f/up 12/23. Pt being managed outpatient. No need to contact at this time - Liz Tripathi PA-C

## 2021-12-08 NOTE — CONSULT NOTE ADULT - SUBJECTIVE AND OBJECTIVE BOX
p (1480)     HPI:  26F s/p TSP for prolactinoma w/ [facility] 12/2/21, dced 12/4 w/ stable pit labs still on cabergoline, has had persistent non-positional bitemporal HA since dc, this AM w/ nausea/vomiting. [facility]'s office prescribed zofran/toradol, however still nauseas. CTH stable postop changes, no clinical e/o CSF leak (also evaled by ENT), and afebrile w/ mild leukocytosis 13. Exam: AOx3, well appearing, PERRL, EOMI, no facial, VFF, GREENBERG 5/5, no photophobia, neck stiffness or kernigs sign.     --Anticoagulation:    =====================  PAST MEDICAL HISTORY   History of pituitary tumor    Chronic back pain    Vitamin D deficiency    Heart palpitations      PAST SURGICAL HISTORY   No significant past surgical history          MEDICATIONS:  Antibiotics:    Neuro:    Other:      SOCIAL HISTORY:   Occupation:   Marital Status:     FAMILY HISTORY:      ROS: Negative except per HPI    LABS:                          11.5   13.32 )-----------( 253      ( 07 Dec 2021 20:45 )             34.6     12-07    134<L>  |  98  |  7   ----------------------------<  121<H>  3.9   |  25  |  0.53    Ca    9.4      07 Dec 2021 20:45    TPro  8.0  /  Alb  4.5  /  TBili  0.5  /  DBili  x   /  AST  27  /  ALT  9<L>  /  AlkPhos  70  12-07       p (1480)     HPI:  26F s/p TSP for prolactinoma w/ [facility] 12/2/21, dced 12/4 w/ stable pit labs still on cabergoline, has had persistent non-positional bitemporal HA since dc, this AM w/ nausea/vomiting. [facility]'s office prescribed zofran/tramadol, however still nauseas. CTH stable postop changes, no clinical e/o CSF leak (also evaled by ENT), and afebrile w/ mild leukocytosis 13. Exam: AOx3, well appearing, PERRL, EOMI, no facial, VFF, GREENBERG 5/5, no photophobia, neck stiffness or kernigs sign.     --Anticoagulation:    =====================  PAST MEDICAL HISTORY   History of pituitary tumor    Chronic back pain    Vitamin D deficiency    Heart palpitations      PAST SURGICAL HISTORY   No significant past surgical history          MEDICATIONS:  Antibiotics:    Neuro:    Other:      SOCIAL HISTORY:   Occupation:   Marital Status:     FAMILY HISTORY:      ROS: Negative except per HPI    LABS:                          11.5   13.32 )-----------( 253      ( 07 Dec 2021 20:45 )             34.6     12-07    134<L>  |  98  |  7   ----------------------------<  121<H>  3.9   |  25  |  0.53    Ca    9.4      07 Dec 2021 20:45    TPro  8.0  /  Alb  4.5  /  TBili  0.5  /  DBili  x   /  AST  27  /  ALT  9<L>  /  AlkPhos  70  12-07

## 2021-12-09 ENCOUNTER — NON-APPOINTMENT (OUTPATIENT)
Age: 26
End: 2021-12-09

## 2021-12-10 ENCOUNTER — APPOINTMENT (OUTPATIENT)
Dept: OTOLARYNGOLOGY | Facility: CLINIC | Age: 26
End: 2021-12-10
Payer: COMMERCIAL

## 2021-12-10 VITALS
BODY MASS INDEX: 21.99 KG/M2 | HEART RATE: 66 BPM | DIASTOLIC BLOOD PRESSURE: 63 MMHG | SYSTOLIC BLOOD PRESSURE: 99 MMHG | WEIGHT: 132 LBS | HEIGHT: 65 IN

## 2021-12-10 PROCEDURE — 99024 POSTOP FOLLOW-UP VISIT: CPT

## 2021-12-10 PROCEDURE — 31237 NSL/SINS NDSC SURG BX POLYPC: CPT | Mod: 50,58

## 2021-12-10 NOTE — ASSESSMENT
[FreeTextEntry1] : s/p TSPR:\par - healing nicely\par - debrided today\par - continue irrigations at least BID\par - f/u 2-3 weeks

## 2021-12-10 NOTE — PHYSICAL EXAM
[Midline] : trachea located in midline position [Normal] : no rashes [de-identified] : b/l crusting

## 2021-12-10 NOTE — REASON FOR VISIT
[Subsequent Evaluation] : a subsequent evaluation for [FreeTextEntry2] : s/p endoscopic transnasal transsphenoidal approach to sella, removal of tumor,harvest of fat graft,use of stereotactic guidance 12/2/21

## 2021-12-10 NOTE — PROCEDURE
[FreeTextEntry6] : Afrin and lidocaine were topically sprayed. Rigid scope #1 was used. Right nasal passage with crusting that was debrided with forceps. Sphenoidotomy patent with mucoid secretions and exudate with no crusting. Left nasal passage with crusting that was debrided with forceps. Sphenoidotomy patent with minimal secretions and some adhesion from middle turbinate. No evidence of CSF leak. Nasopharynx clear.

## 2021-12-10 NOTE — CONSULT LETTER
[Dear  ___] : Dear  [unfilled], [Courtesy Letter:] : I had the pleasure of seeing your patient, [unfilled], in my office today. [Please see my note below.] : Please see my note below. [Sincerely,] : Sincerely, [DrKelly  ___] : Dr. CROUCH [FreeTextEntry2] : Dr Murillo [FreeTextEntry3] : Vidhi Marie MD\par Otolaryngology and Cranial Base Surgery\par Attending Physician - Department of Otolaryngology and Head & Neck Surgery

## 2021-12-10 NOTE — HISTORY OF PRESENT ILLNESS
[de-identified] : 26 year old female presents s/p endoscopic transnasal transsphenoidal approach to sella, removal of tumor,harvest of fat graft,use of stereotactic guidance 12/2/21. States has throbbing pain by temple area, worse when laying down,  occasionally anterior rhinorrhea, poor sense of taste and smell, had foul odor and taste this morning from back of the throat, persistent headaches, currently using Tylenol. Patient went back to ER Tuesday due to vomiting caused by the headache.  Denies recent fevers, salty or metallic taste,and nasal congestion. Currently using saline rinse 4x, dark maroon colored discharge with mucus. Sense of smell is poor and taste is poor.

## 2021-12-15 LAB
CORTICOSTEROID BINDING GLOBULIN RESULT: 1.8 MG/DL — SIGNIFICANT CHANGE UP
CORTIS F/TOTAL MFR SERPL: 58 % — SIGNIFICANT CHANGE UP
CORTIS SERPL-MCNC: 29 UG/DL — HIGH
CORTISOL, FREE RESULT: 17 UG/DL — HIGH

## 2021-12-16 ENCOUNTER — APPOINTMENT (OUTPATIENT)
Dept: SPINE | Facility: CLINIC | Age: 26
End: 2021-12-16
Payer: COMMERCIAL

## 2021-12-16 VITALS
HEIGHT: 65 IN | OXYGEN SATURATION: 100 % | HEART RATE: 76 BPM | WEIGHT: 132 LBS | DIASTOLIC BLOOD PRESSURE: 69 MMHG | BODY MASS INDEX: 21.99 KG/M2 | SYSTOLIC BLOOD PRESSURE: 93 MMHG

## 2021-12-16 DIAGNOSIS — E87.1 HYPO-OSMOLALITY AND HYPONATREMIA: ICD-10-CM

## 2021-12-16 LAB — OSMOLALITY SERPL: 282 MOSMOL/KG

## 2021-12-16 PROCEDURE — 99024 POSTOP FOLLOW-UP VISIT: CPT

## 2021-12-16 RX ORDER — CABERGOLINE 0.5 MG/1
0.5 TABLET ORAL
Qty: 96 | Refills: 1 | Status: DISCONTINUED | COMMUNITY
Start: 2019-10-28 | End: 2021-12-16

## 2021-12-16 NOTE — HISTORY OF PRESENT ILLNESS
[FreeTextEntry1] : TR MALONE is a 26 year old lady who originally presented with irregular menses and was found to have a prolactinoma.  She had been on cabergoline at escalating doses.  Her tumor did decrease in size compared to the original MRI on 2019 but was still present.  It was recommended that she would benefit from a surgical resection.  The patient underwent surgery to remove the pituitary adenoma on 12/2/2021.\par

## 2021-12-16 NOTE — END OF VISIT
[Time Spent: ___ minutes] : I have spent [unfilled] minutes of time on the encounter. The patient is a 14y Male complaining of unresponsive.

## 2021-12-16 NOTE — REASON FOR VISIT
[de-identified] : Endoscopic transnasal transsphenoidal approach to the sella for removal of a pituitary adenoma and harvest of a fat graft.  Surgeon:  Dr. Karan Murillo M.D. [de-identified] : 12/02/2021 [de-identified] : 14 [de-identified] : 2 [de-identified] : The patient stated that she is feeling better.  She no longer feels nauseous or has headaches.  She did not wake up with a headache today. She did go to the ER regarding headaches on 12/08/2021 and was found to have a stable CT scan and normal blood work.  Her Sodium was 134.   The patient reported that she felt lightheaded when standing up on Saturday and this resolved when she sat down.  She stated that this occurred twice but has not happened recently.  She stated that last Friday night, she did have excessive thirst and polyuria which stopped on Saturday.  The patient denies any evidence of CSF leak or visual problems.  She was seen by Dr. Marie on 12/10/2021 and will follow up on the 29th. Blood work was done on 12/10/2021 which was ordered by Dr. Ziegler which revealed a sodium of 127, a low serum osmolality and a normal urine osmolality.  She has an appointment with Dr. Marvin Evans on 12/23/2021.

## 2021-12-16 NOTE — PHYSICAL EXAM
[General Appearance - Alert] : alert [General Appearance - In No Acute Distress] : in no acute distress [General Appearance - Well Nourished] : well nourished [General Appearance - Well Developed] : well developed [General Appearance - Well-Appearing] : healthy appearing [] : normal voice and communication [Clean] : clean [Dry] : dry [Healing Well] : healing well [Intact] : intact [No Drainage] : without drainage [Normal Skin] : normal [Normal Skin Turgor] : skin turgor was normal [Oriented To Time, Place, And Person] : oriented to person, place, and time [Impaired Insight] : insight and judgment were intact [Affect] : the affect was normal [Mood] : the mood was normal [Memory Recent] : recent memory was not impaired [Memory Remote] : remote memory was not impaired [Person] : oriented to person [Place] : oriented to place [Time] : oriented to time [Short Term Intact] : short term memory intact [Remote Intact] : remote memory intact [Span Intact] : the attention span was normal [Concentration Intact] : normal concentrating ability [Fluency] : fluency intact [Comprehension] : comprehension intact [Current Events] : adequate knowledge of current events [Past History] : adequate knowledge of personal past history [Vocabulary] : adequate range of vocabulary [Cranial Nerves Optic (II)] : visual acuity intact bilaterally,  pupils equal round and reactive to light [Cranial Nerves Oculomotor (III)] : extraocular motion intact [Cranial Nerves Trigeminal (V)] : facial sensation intact symmetrically [Cranial Nerves Facial (VII)] : face symmetrical [Cranial Nerves Vestibulocochlear (VIII)] : hearing was intact bilaterally [Cranial Nerves Glossopharyngeal (IX)] : tongue and palate midline [Cranial Nerves Accessory (XI - Cranial And Spinal)] : head turning and shoulder shrug symmetric [Cranial Nerves Hypoglossal (XII)] : there was no tongue deviation with protrusion [Motor Tone] : muscle tone was normal in all four extremities [Motor Strength] : muscle strength was normal in all four extremities [No Muscle Atrophy] : normal bulk in all four extremities [Sensation Tactile Decrease] : light touch was intact [Abnormal Walk] : normal gait [Balance] : balance was intact [2+] : Patella left 2+ [Erythema] : not erythematous [Warm] : not warm [FreeTextEntry1] : umbilical [Past-pointing] : there was no past-pointing [Tremor] : no tremor present

## 2021-12-16 NOTE — DATA REVIEWED
[de-identified] : Brain and sella with and without contrast from 12/7/2021 (postoperative) was reviewed and compared to the preoperative image from 10/27/2021.

## 2021-12-17 ENCOUNTER — NON-APPOINTMENT (OUTPATIENT)
Age: 26
End: 2021-12-17

## 2021-12-17 LAB
ANION GAP SERPL CALC-SCNC: 18 MMOL/L
BUN SERPL-MCNC: 10 MG/DL
CALCIUM SERPL-MCNC: 9.6 MG/DL
CHLORIDE SERPL-SCNC: 102 MMOL/L
CO2 SERPL-SCNC: 20 MMOL/L
CREAT SERPL-MCNC: 0.65 MG/DL
GLUCOSE SERPL-MCNC: 87 MG/DL
OSMOLALITY UR: 439 MOSM/KG
POTASSIUM SERPL-SCNC: 4.5 MMOL/L
SODIUM SERPL-SCNC: 140 MMOL/L

## 2021-12-20 LAB
ACTH SER-ACNC: 21.1 PG/ML
ANION GAP SERPL CALC-SCNC: 12 MMOL/L
BUN SERPL-MCNC: 6 MG/DL
CALCIUM SERPL-MCNC: 8.9 MG/DL
CHLORIDE SERPL-SCNC: 93 MMOL/L
CO2 SERPL-SCNC: 23 MMOL/L
CORTIS SERPL-MCNC: 12.1 UG/DL
CREAT SERPL-MCNC: 0.57 MG/DL
FT4I SERPL CALC-MCNC: 7 INDEX
GLUCOSE SERPL-MCNC: 101 MG/DL
OSMOLALITY SERPL: 256 MOSMOL/KG
OSMOLALITY UR: 574 MOSM/KG
POTASSIUM SERPL-SCNC: 4.2 MMOL/L
SODIUM SERPL-SCNC: 127 MMOL/L
T4 FREE SERPL-MCNC: 1.4 NG/DL
T4 SERPL-MCNC: 6.9 UG/DL

## 2021-12-23 ENCOUNTER — APPOINTMENT (OUTPATIENT)
Dept: ENDOCRINOLOGY | Facility: CLINIC | Age: 26
End: 2021-12-23
Payer: COMMERCIAL

## 2021-12-23 VITALS
BODY MASS INDEX: 21.63 KG/M2 | HEART RATE: 77 BPM | SYSTOLIC BLOOD PRESSURE: 100 MMHG | OXYGEN SATURATION: 99 % | TEMPERATURE: 97.6 F | WEIGHT: 130 LBS | DIASTOLIC BLOOD PRESSURE: 70 MMHG

## 2021-12-23 PROCEDURE — 99214 OFFICE O/P EST MOD 30 MIN: CPT

## 2021-12-23 NOTE — HISTORY OF PRESENT ILLNESS
[FreeTextEntry1] : Ms. TR MALONE is a  26 year old female with no significant PMH presents for follow up visit for new macroprolactinoma.  Diagnosed in Oct 2019.  \par \par Patient reports first started to have symptoms when started to have irregular periods in 2016. Saw PCP in 2018. Had hormone levels checked at that time and reports prolactin was 800s at that time. Reports was told likely 2/2 possible PCOS and was being monitored. \par \par Since then saw OBGYN and prolactin was rechecked and was 2600s. 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 Reports can skip a month every 3-4 months. Spotting in between. Menarche 12. No prior history of pregnancy.   \par \par She reports feeling well, she reported that her menses had restarted since February 2020.  She has been getting monthly menstrual, about every 21 days.  \par \par MRI done in 2020, heterogenous area of enhancement is seen, has diminished since last imaging, 1.9x1.2 cm and previously measures approximately 2.3x1.9cm. She was on escalating dose of Cabergoline, taking2 mg twice weekly.  \par \par Patient now s/p endoscopic transnasal transsphenoidal approach to sella, removal of tumor,harvest of fat graft,use of stereotactic guidance 12/2/21.  Here for follow up.  She's doing well postop.  No headache, no visual changes, no rhinorreha.  \par \par \par \par \par \par \par \par

## 2021-12-23 NOTE — DATA REVIEWED
[FreeTextEntry1] :  endoscopic transnasal transsphenoidal approach to sella, removal of tumor,harvest of fat graft,use of stereotactic guidance 12/2/21

## 2021-12-23 NOTE — ASSESSMENT
[FreeTextEntry1] : Ms. TR MALONE is 26 year old here for follow up prolactinoma. \par \par 1. Prolactinoma s/p TSA on 12/2/2021\par See pathology as above. 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 Occasional  tumor cells are positive for LH and rare tumor cells are positive for GH. The Ki-67 labeling index is up to 1-2%. 1-3% tumor cell\par nuclei are positive for p53.\par Will check full pit hormone level AM as below. \par \par 2. Elevated DHEAS level\par May be secondary to elevated prolactin, we will repeat DHEAS level again today.  It was normal in January 2020.\par \par \par FU in 4 months

## 2021-12-23 NOTE — PHYSICAL EXAM
[Alert] : alert [Well Nourished] : well nourished [EOMI] : extra ocular movement intact [No Lid Lag] : no lid lag [Normal Hearing] : hearing was normal [No Neck Mass] : no neck mass was observed [No Respiratory Distress] : no respiratory distress [No Accessory Muscle Use] : no accessory muscle use [Normal Rate and Effort] : normal respiratory rate and effort [No Clubbing, Cyanosis] : no clubbing  or cyanosis of the fingernails [No Motor Deficits] : the motor exam was normal [No Tremors] : no tremors [Oriented x3] : oriented to person, place, and time [Normal Affect] : the affect was normal [Normal Insight/Judgement] : insight and judgment were intact [Normal Mood] : the mood was normal [de-identified] : Unable to perform auscultations [de-identified] : Unable to perform auscultation

## 2021-12-28 ENCOUNTER — TRANSCRIPTION ENCOUNTER (OUTPATIENT)
Age: 26
End: 2021-12-28

## 2021-12-29 ENCOUNTER — APPOINTMENT (OUTPATIENT)
Dept: OTOLARYNGOLOGY | Facility: CLINIC | Age: 26
End: 2021-12-29
Payer: COMMERCIAL

## 2021-12-29 VITALS
HEIGHT: 65 IN | DIASTOLIC BLOOD PRESSURE: 68 MMHG | HEART RATE: 78 BPM | WEIGHT: 130 LBS | TEMPERATURE: 97.7 F | SYSTOLIC BLOOD PRESSURE: 96 MMHG | BODY MASS INDEX: 21.66 KG/M2

## 2021-12-29 DIAGNOSIS — J34.89 OTHER SPECIFIED DISORDERS OF NOSE AND NASAL SINUSES: ICD-10-CM

## 2021-12-29 DIAGNOSIS — R09.81 NASAL CONGESTION: ICD-10-CM

## 2021-12-29 PROCEDURE — 99024 POSTOP FOLLOW-UP VISIT: CPT

## 2021-12-29 PROCEDURE — 31231 NASAL ENDOSCOPY DX: CPT | Mod: 58

## 2021-12-29 NOTE — ASSESSMENT
[FreeTextEntry1] : s/p TSPR:\par - well healed\par - cont irrigations daily for a few weeks then taper to every other day, then weekly and then can stop over the next few weeks\par - note her prolactin is still elevated and cabergoline restarted per endo; will d/w Dr. Murillo\par \par f/u PRN

## 2021-12-29 NOTE — HISTORY OF PRESENT ILLNESS
[de-identified] : 26 year old female presents s/p endoscopic transnasal transsphenoidal approach to sella, removal of tumor,harvest of fat graft,use of stereotactic guidance 12/2/21.  [FreeTextEntry1] : Pt following up today s/p surgery 12/02/2021. Doing saline washes BID \par pt states sense of smell and taste is back and almost normal.

## 2021-12-29 NOTE — PROCEDURE
[FreeTextEntry6] : Flex scope #32 was used. Right nasal passage clear. Sphenoidotomy is patent and clear with well healed sphenoid with transmitted pulsation noted. \par Left nasal passage clear. Sphenoidotomy patent with some adhesion from middle turbinate. No evidence of CSF leak. Nasopharynx clear.

## 2022-01-03 ENCOUNTER — TRANSCRIPTION ENCOUNTER (OUTPATIENT)
Age: 27
End: 2022-01-03

## 2022-01-03 LAB
ACTH-ESO: 13 PG/ML
ANION GAP SERPL CALC-SCNC: 13 MMOL/L
BUN SERPL-MCNC: 12 MG/DL
CALCIUM SERPL-MCNC: 9.4 MG/DL
CHLORIDE SERPL-SCNC: 103 MMOL/L
CO2 SERPL-SCNC: 25 MMOL/L
CORTIS SERPL-MCNC: 8.6 UG/DL
CREAT SERPL-MCNC: 0.67 MG/DL
DHEA-SULFATE, SERUM: 164 UG/DL
ESTRADIOL SERPL-MCNC: 215 PG/ML
FSH SERPL-MCNC: 6.4 IU/L
GLUCOSE SERPL-MCNC: 92 MG/DL
IGF-1 INTERP: NORMAL
IGF-I BLD-MCNC: 171 NG/ML
LH SERPL-ACNC: 10.2 IU/L
POTASSIUM SERPL-SCNC: 4.1 MMOL/L
PROLACTIN SERPL-MCNC: 85.4 NG/ML
SODIUM SERPL-SCNC: 141 MMOL/L
T4 FREE SERPL-MCNC: 1.2 NG/DL
TSH SERPL-ACNC: 1.76 UIU/ML

## 2022-01-05 ENCOUNTER — APPOINTMENT (OUTPATIENT)
Dept: SPINE | Facility: CLINIC | Age: 27
End: 2022-01-05
Payer: COMMERCIAL

## 2022-01-05 VITALS
BODY MASS INDEX: 21.66 KG/M2 | WEIGHT: 130 LBS | DIASTOLIC BLOOD PRESSURE: 78 MMHG | HEIGHT: 65 IN | SYSTOLIC BLOOD PRESSURE: 116 MMHG | HEART RATE: 68 BPM | OXYGEN SATURATION: 100 %

## 2022-01-05 PROCEDURE — 99024 POSTOP FOLLOW-UP VISIT: CPT

## 2022-01-05 RX ORDER — TRAMADOL HYDROCHLORIDE 50 MG/1
50 TABLET, COATED ORAL
Qty: 14 | Refills: 0 | Status: DISCONTINUED | COMMUNITY
Start: 2021-12-07 | End: 2022-01-05

## 2022-01-05 RX ORDER — ONDANSETRON 4 MG/1
4 TABLET ORAL
Qty: 14 | Refills: 0 | Status: DISCONTINUED | COMMUNITY
Start: 2021-12-07 | End: 2022-01-05

## 2022-01-12 ENCOUNTER — NON-APPOINTMENT (OUTPATIENT)
Age: 27
End: 2022-01-12

## 2022-01-24 ENCOUNTER — NON-APPOINTMENT (OUTPATIENT)
Age: 27
End: 2022-01-24

## 2022-01-24 ENCOUNTER — APPOINTMENT (OUTPATIENT)
Dept: OPHTHALMOLOGY | Facility: CLINIC | Age: 27
End: 2022-01-24
Payer: COMMERCIAL

## 2022-01-24 PROCEDURE — 92083 EXTENDED VISUAL FIELD XM: CPT

## 2022-01-24 PROCEDURE — 92133 CPTRZD OPH DX IMG PST SGM ON: CPT

## 2022-01-24 PROCEDURE — 99214 OFFICE O/P EST MOD 30 MIN: CPT

## 2022-02-01 LAB — PROLACTIN SERPL-MCNC: 118 NG/ML

## 2022-03-16 ENCOUNTER — APPOINTMENT (OUTPATIENT)
Dept: MRI IMAGING | Facility: IMAGING CENTER | Age: 27
End: 2022-03-16
Payer: COMMERCIAL

## 2022-03-16 ENCOUNTER — OUTPATIENT (OUTPATIENT)
Dept: OUTPATIENT SERVICES | Facility: HOSPITAL | Age: 27
LOS: 1 days | End: 2022-03-16
Payer: COMMERCIAL

## 2022-03-16 DIAGNOSIS — Z00.8 ENCOUNTER FOR OTHER GENERAL EXAMINATION: ICD-10-CM

## 2022-03-16 DIAGNOSIS — D35.2 BENIGN NEOPLASM OF PITUITARY GLAND: ICD-10-CM

## 2022-03-16 PROCEDURE — 70553 MRI BRAIN STEM W/O & W/DYE: CPT

## 2022-03-16 PROCEDURE — 70553 MRI BRAIN STEM W/O & W/DYE: CPT | Mod: 26

## 2022-03-16 PROCEDURE — A9585: CPT

## 2022-03-18 LAB — PROLACTIN SERPL-MCNC: 73.4 NG/ML

## 2022-03-23 ENCOUNTER — APPOINTMENT (OUTPATIENT)
Dept: SPINE | Facility: CLINIC | Age: 27
End: 2022-03-23
Payer: COMMERCIAL

## 2022-03-23 VITALS
WEIGHT: 130 LBS | HEIGHT: 65 IN | HEART RATE: 67 BPM | DIASTOLIC BLOOD PRESSURE: 72 MMHG | BODY MASS INDEX: 21.66 KG/M2 | SYSTOLIC BLOOD PRESSURE: 109 MMHG | OXYGEN SATURATION: 100 %

## 2022-03-23 PROCEDURE — 99213 OFFICE O/P EST LOW 20 MIN: CPT

## 2022-05-06 ENCOUNTER — LABORATORY RESULT (OUTPATIENT)
Age: 27
End: 2022-05-06

## 2022-05-10 ENCOUNTER — APPOINTMENT (OUTPATIENT)
Dept: ENDOCRINOLOGY | Facility: CLINIC | Age: 27
End: 2022-05-10
Payer: COMMERCIAL

## 2022-05-10 VITALS
WEIGHT: 128 LBS | DIASTOLIC BLOOD PRESSURE: 60 MMHG | HEIGHT: 65 IN | BODY MASS INDEX: 21.33 KG/M2 | OXYGEN SATURATION: 98 % | SYSTOLIC BLOOD PRESSURE: 104 MMHG | TEMPERATURE: 98.3 F | HEART RATE: 92 BPM

## 2022-05-10 DIAGNOSIS — R79.89 OTHER SPECIFIED ABNORMAL FINDINGS OF BLOOD CHEMISTRY: ICD-10-CM

## 2022-05-10 PROCEDURE — 99214 OFFICE O/P EST MOD 30 MIN: CPT

## 2022-07-22 ENCOUNTER — APPOINTMENT (OUTPATIENT)
Dept: MRI IMAGING | Facility: IMAGING CENTER | Age: 27
End: 2022-07-22

## 2022-07-22 ENCOUNTER — OUTPATIENT (OUTPATIENT)
Dept: OUTPATIENT SERVICES | Facility: HOSPITAL | Age: 27
LOS: 1 days | End: 2022-07-22
Payer: COMMERCIAL

## 2022-07-22 DIAGNOSIS — Z00.8 ENCOUNTER FOR OTHER GENERAL EXAMINATION: ICD-10-CM

## 2022-07-22 PROCEDURE — 70553 MRI BRAIN STEM W/O & W/DYE: CPT

## 2022-07-22 PROCEDURE — 70553 MRI BRAIN STEM W/O & W/DYE: CPT | Mod: 26

## 2022-07-22 PROCEDURE — A9585: CPT

## 2022-07-26 ENCOUNTER — TRANSCRIPTION ENCOUNTER (OUTPATIENT)
Age: 27
End: 2022-07-26

## 2022-07-27 ENCOUNTER — APPOINTMENT (OUTPATIENT)
Dept: SPINE | Facility: CLINIC | Age: 27
End: 2022-07-27

## 2022-07-27 VITALS
BODY MASS INDEX: 21.33 KG/M2 | WEIGHT: 128 LBS | OXYGEN SATURATION: 98 % | SYSTOLIC BLOOD PRESSURE: 96 MMHG | HEIGHT: 65 IN | DIASTOLIC BLOOD PRESSURE: 59 MMHG | HEART RATE: 57 BPM

## 2022-07-27 PROCEDURE — 99213 OFFICE O/P EST LOW 20 MIN: CPT

## 2022-07-28 LAB — PROLACTIN SERPL-MCNC: 78 NG/ML

## 2022-09-29 NOTE — PRE-OP CHECKLIST - AS TEMP SITE
oral Mart-1 - Positive Histology Text: MART-1 staining demonstrates areas of higher density and clustering of melanocytes with Pagetoid spread upwards within the epidermis. The surgical margins are positive for tumor cells.

## 2022-10-05 ENCOUNTER — NON-APPOINTMENT (OUTPATIENT)
Age: 27
End: 2022-10-05

## 2022-11-11 ENCOUNTER — APPOINTMENT (OUTPATIENT)
Dept: INTERNAL MEDICINE | Facility: CLINIC | Age: 27
End: 2022-11-11

## 2022-11-11 VITALS
BODY MASS INDEX: 21.33 KG/M2 | OXYGEN SATURATION: 99 % | SYSTOLIC BLOOD PRESSURE: 80 MMHG | WEIGHT: 128 LBS | HEIGHT: 65 IN | DIASTOLIC BLOOD PRESSURE: 50 MMHG | HEART RATE: 70 BPM

## 2022-11-11 DIAGNOSIS — Z23 ENCOUNTER FOR IMMUNIZATION: ICD-10-CM

## 2022-11-11 DIAGNOSIS — Z86.018 PERSONAL HISTORY OF OTHER BENIGN NEOPLASM: ICD-10-CM

## 2022-11-11 PROCEDURE — 90686 IIV4 VACC NO PRSV 0.5 ML IM: CPT

## 2022-11-11 PROCEDURE — G0444 DEPRESSION SCREEN ANNUAL: CPT | Mod: 59

## 2022-11-11 PROCEDURE — G0008: CPT

## 2022-11-11 PROCEDURE — 99395 PREV VISIT EST AGE 18-39: CPT | Mod: 25

## 2022-11-22 ENCOUNTER — APPOINTMENT (OUTPATIENT)
Dept: ENDOCRINOLOGY | Facility: CLINIC | Age: 27
End: 2022-11-22

## 2022-11-22 VITALS
BODY MASS INDEX: 21.33 KG/M2 | HEART RATE: 77 BPM | DIASTOLIC BLOOD PRESSURE: 68 MMHG | OXYGEN SATURATION: 99 % | SYSTOLIC BLOOD PRESSURE: 90 MMHG | WEIGHT: 128 LBS | HEIGHT: 65 IN | TEMPERATURE: 97.9 F

## 2022-11-22 PROCEDURE — 99214 OFFICE O/P EST MOD 30 MIN: CPT

## 2022-11-23 NOTE — ASSESSMENT
[FreeTextEntry1] : Ms. TR MALONE is a 27year old here for follow up prolactinoma. \par \par 1. Prolactinoma s/p TSA on 12/2/2021\par See pathology as above. 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 Occasional  tumor cells are positive for LH and rare tumor cells are positive for GH. The Ki-67 labeling index is up to 1-2%. 1-3% tumor cell nuclei are positive for p53.\par Currently on cabergoline 0.5 mg 3 times weekly.\par Prolactin continue to be elevated to 84.8 ng/mL on November 2022.\par She reports regular menses.\par Will increase cabergoline to 1 mg twice weekly, total cabergoline dose at 2.0 mg/week..  \par Repeat prolactin level in 1 month.\par Check LH, FSH, estradiol level.  Last LMP 11/22/2022\par \par 2. Elevated DHEAS level\par May be secondary to elevated prolactin, we will repeat DHEAS level again today.  It was normal in January 2020.\par \par 3.  Elevated A1c level\par Repeat A1c level in 3 months.\par \par Follow-up with me in 4 to 5 months.\par Has an appointment to see Dr. Murillo next month
Non-contributory.

## 2022-11-23 NOTE — PHYSICAL EXAM
[Alert] : alert [Well Nourished] : well nourished [EOMI] : extra ocular movement intact [No Lid Lag] : no lid lag [Normal Hearing] : hearing was normal [No Neck Mass] : no neck mass was observed [No Respiratory Distress] : no respiratory distress [No Accessory Muscle Use] : no accessory muscle use [Normal Rate and Effort] : normal respiratory rate and effort [No Clubbing, Cyanosis] : no clubbing  or cyanosis of the fingernails [No Motor Deficits] : the motor exam was normal [No Tremors] : no tremors [Oriented x3] : oriented to person, place, and time [Normal Affect] : the affect was normal [Normal Insight/Judgement] : insight and judgment were intact [Normal Mood] : the mood was normal [de-identified] : Unable to perform auscultations [de-identified] : Unable to perform auscultation

## 2022-11-23 NOTE — HISTORY OF PRESENT ILLNESS
[FreeTextEntry1] : Ms. TR MALONE is a  26 year old female with no significant PMH presents for follow up visit for new macroprolactinoma.  Diagnosed in Oct 2019.  \par \par Patient reports first started to have symptoms when started to have irregular periods in 2016. Saw PCP in 2018. Had hormone levels checked at that time and reports prolactin was 800s at that time. Reports was told likely 2/2 possible PCOS and was being monitored. \par \par Since then saw OBGYN and prolactin was rechecked and was 2600s. 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 Reports can skip a month every 3-4 months. Spotting in between. Menarche 12. No prior history of pregnancy.   \par \par She reports feeling well, she reported that her menses had restarted since February 2020.  She has been getting monthly menstrual, about every 21 days.  \par \par MRI done in 2020, heterogenous area of enhancement is seen, has diminished since last imaging, 1.9x1.2 cm and previously measures approximately 2.3x1.9cm. She was on escalating dose of Cabergoline, taking2 mg twice weekly.  \par \par Patient now s/p endoscopic transnasal transsphenoidal approach to sella, removal of tumor,harvest of fat graft,use of stereotactic guidance 12/2/21. \par \par She saw Dr. Silva in May 2022.\par \par Patient was restarted on cabergoline 0.25 mg twice weekly for persistently elevated prolactin level.  Eventually increase to 0.5 mg 3 times weekly.\par \par She has a follow-up appointment with Dr. Murillo planned for December 2022.  The plan is to repeat MRI again.\par \par Of note, found to have mildly elevated A1c level of 5.8% with primary care doctor.  Family history of diabetes.\par \par \par \par

## 2022-11-27 PROBLEM — Z86.018 HISTORY OF PITUITARY ADENOMA: Status: RESOLVED | Noted: 2021-11-19 | Resolved: 2022-11-27

## 2022-11-27 LAB
25(OH)D3 SERPL-MCNC: 34.3 NG/ML
ALBUMIN SERPL ELPH-MCNC: 4.6 G/DL
ALP BLD-CCNC: 62 U/L
ALT SERPL-CCNC: 14 U/L
ANION GAP SERPL CALC-SCNC: 11 MMOL/L
AST SERPL-CCNC: 36 U/L
BASOPHILS # BLD AUTO: 0.05 K/UL
BASOPHILS NFR BLD AUTO: 0.8 %
BILIRUB SERPL-MCNC: 0.3 MG/DL
BUN SERPL-MCNC: 13 MG/DL
CALCIUM SERPL-MCNC: 9.4 MG/DL
CHLORIDE SERPL-SCNC: 105 MMOL/L
CHOLEST SERPL-MCNC: 146 MG/DL
CO2 SERPL-SCNC: 23 MMOL/L
CREAT SERPL-MCNC: 0.64 MG/DL
EGFR: 123 ML/MIN/1.73M2
EOSINOPHIL # BLD AUTO: 0.14 K/UL
EOSINOPHIL NFR BLD AUTO: 2.1 %
ESTIMATED AVERAGE GLUCOSE: 120 MG/DL
GLUCOSE SERPL-MCNC: 86 MG/DL
HBA1C MFR BLD HPLC: 5.8 %
HCT VFR BLD CALC: 35.8 %
HDLC SERPL-MCNC: 63 MG/DL
HGB BLD-MCNC: 11.3 G/DL
IMM GRANULOCYTES NFR BLD AUTO: 0.3 %
LDLC SERPL CALC-MCNC: 76 MG/DL
LYMPHOCYTES # BLD AUTO: 2.77 K/UL
LYMPHOCYTES NFR BLD AUTO: 42.3 %
MAN DIFF?: NORMAL
MCHC RBC-ENTMCNC: 29.1 PG
MCHC RBC-ENTMCNC: 31.6 GM/DL
MCV RBC AUTO: 92.3 FL
MONOCYTES # BLD AUTO: 0.45 K/UL
MONOCYTES NFR BLD AUTO: 6.9 %
NEUTROPHILS # BLD AUTO: 3.12 K/UL
NEUTROPHILS NFR BLD AUTO: 47.6 %
NONHDLC SERPL-MCNC: 83 MG/DL
PLATELET # BLD AUTO: 240 K/UL
POTASSIUM SERPL-SCNC: 4.1 MMOL/L
PROLACTIN SERPL-MCNC: 84.8 NG/ML
PROT SERPL-MCNC: 7.3 G/DL
RBC # BLD: 3.88 M/UL
RBC # FLD: 13.3 %
SODIUM SERPL-SCNC: 139 MMOL/L
T4 FREE SERPL-MCNC: 1.2 NG/DL
TRIGL SERPL-MCNC: 39 MG/DL
TSH SERPL-ACNC: 2.11 UIU/ML
WBC # FLD AUTO: 6.55 K/UL

## 2022-11-27 NOTE — HISTORY OF PRESENT ILLNESS
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

## 2022-11-27 NOTE — HEALTH RISK ASSESSMENT
[Never] : Never [No] : No [No falls in past year] : Patient reported no falls in the past year [0] : 2) Feeling down, depressed, or hopeless: Not at all (0) [PHQ-2 Negative - No further assessment needed] : PHQ-2 Negative - No further assessment needed [Audit-CScore] : 0 [[id number]] : 0

## 2022-12-05 ENCOUNTER — APPOINTMENT (OUTPATIENT)
Dept: MRI IMAGING | Facility: IMAGING CENTER | Age: 27
End: 2022-12-05

## 2022-12-05 ENCOUNTER — APPOINTMENT (OUTPATIENT)
Dept: RADIOLOGY | Facility: IMAGING CENTER | Age: 27
End: 2022-12-05

## 2022-12-05 ENCOUNTER — OUTPATIENT (OUTPATIENT)
Dept: OUTPATIENT SERVICES | Facility: HOSPITAL | Age: 27
LOS: 1 days | End: 2022-12-05
Payer: COMMERCIAL

## 2022-12-05 DIAGNOSIS — N92.6 IRREGULAR MENSTRUATION, UNSPECIFIED: ICD-10-CM

## 2022-12-05 DIAGNOSIS — D35.2 BENIGN NEOPLASM OF PITUITARY GLAND: ICD-10-CM

## 2022-12-05 DIAGNOSIS — Z00.8 ENCOUNTER FOR OTHER GENERAL EXAMINATION: ICD-10-CM

## 2022-12-05 PROCEDURE — 77080 DXA BONE DENSITY AXIAL: CPT | Mod: 26

## 2022-12-05 PROCEDURE — A9585: CPT

## 2022-12-05 PROCEDURE — 70553 MRI BRAIN STEM W/O & W/DYE: CPT | Mod: 26

## 2022-12-05 PROCEDURE — 77080 DXA BONE DENSITY AXIAL: CPT

## 2022-12-05 PROCEDURE — 70553 MRI BRAIN STEM W/O & W/DYE: CPT

## 2022-12-14 ENCOUNTER — APPOINTMENT (OUTPATIENT)
Dept: SPINE | Facility: CLINIC | Age: 27
End: 2022-12-14

## 2022-12-14 VITALS
OXYGEN SATURATION: 98 % | SYSTOLIC BLOOD PRESSURE: 104 MMHG | HEART RATE: 59 BPM | BODY MASS INDEX: 21.33 KG/M2 | DIASTOLIC BLOOD PRESSURE: 73 MMHG | WEIGHT: 128 LBS | HEIGHT: 65 IN

## 2022-12-14 PROCEDURE — 99213 OFFICE O/P EST LOW 20 MIN: CPT

## 2022-12-22 ENCOUNTER — EMERGENCY (EMERGENCY)
Facility: HOSPITAL | Age: 27
LOS: 1 days | Discharge: HOME | End: 2022-12-22
Attending: EMERGENCY MEDICINE
Payer: COMMERCIAL

## 2022-12-22 VITALS
WEIGHT: 128.09 LBS | OXYGEN SATURATION: 99 % | HEART RATE: 65 BPM | RESPIRATION RATE: 16 BRPM | TEMPERATURE: 98 F | SYSTOLIC BLOOD PRESSURE: 105 MMHG | DIASTOLIC BLOOD PRESSURE: 69 MMHG | HEIGHT: 65 IN

## 2022-12-22 PROCEDURE — 99284 EMERGENCY DEPT VISIT MOD MDM: CPT

## 2022-12-22 PROCEDURE — 99282 EMERGENCY DEPT VISIT SF MDM: CPT

## 2022-12-22 PROCEDURE — 99053 MED SERV 10PM-8AM 24 HR FAC: CPT

## 2022-12-22 NOTE — ED ADULT TRIAGE NOTE - CHIEF COMPLAINT QUOTE
pt c/o needle stick to R middle finger in OR; pt reports she was wearing gloves, pt denies bleeding/skin break

## 2022-12-22 NOTE — ED PROVIDER NOTE - NSFOLLOWUPINSTRUCTIONS_ED_ALL_ED_FT
Follow up with [facility] within the next 3 days  Bring Source patient testing kit to unit so source patients labs can be drawn  Return to the Emergency Room if you experience new or worsening symptoms

## 2022-12-22 NOTE — ED PROVIDER NOTE - PROGRESS NOTE DETAILS
Very low risk of transmission explained to the patient, PEP offered patient declined.  Patient will be tested and will bring packet to the OR management to get source patient tested. Will dc with employee health follow up. Discussed plan and return precautions with patient who understands and agrees. All questions answered. - Benjamin Sam PA-C

## 2022-12-22 NOTE — ED PROVIDER NOTE - OBJECTIVE STATEMENT
19-year-old female no reported past medical history presenting with needlestick.  Patient works as a device rep, was working in OR tonight, while moving patient patient was exposed to needle which scratched her right third finger.  Patient believes it did not puncture the skin as she did not see bleeding.  Source patient has history of MRSA but no known history of HIV or hepatitis, is agreeable to testing.  ED patient has no known history of hepatitis or HIV, vaccinations including Tdap up-to-date.  Requests testing

## 2022-12-22 NOTE — ED PROVIDER NOTE - CARE PLAN
1 Principal Discharge DX:	Needle stick, hypodermic, accidental   Principal Discharge DX:	Needle stick, hypodermic, accidental  Goal:	volar R 3rd finger

## 2022-12-22 NOTE — ED PROVIDER NOTE - NS ED ATTENDING STATEMENT MOD
This was a shared visit with the JUNE. I reviewed and verified the documentation and independently performed the documented:

## 2022-12-23 VITALS
SYSTOLIC BLOOD PRESSURE: 110 MMHG | RESPIRATION RATE: 17 BRPM | TEMPERATURE: 98 F | HEART RATE: 70 BPM | OXYGEN SATURATION: 99 % | DIASTOLIC BLOOD PRESSURE: 70 MMHG

## 2022-12-23 NOTE — ED ADULT NURSE NOTE - OBJECTIVE STATEMENT
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 No PMH. Pt was moving pt when she was exposed to needle that scratched her right third finger. Requesting testing.

## 2022-12-27 ENCOUNTER — TRANSCRIPTION ENCOUNTER (OUTPATIENT)
Age: 27
End: 2022-12-27

## 2023-01-09 LAB
DHEA-SULFATE, SERUM: 221 UG/DL
ESTRADIOL SERPL-MCNC: 88 PG/ML
FSH SERPL-MCNC: 5.9 IU/L
LH SERPL-ACNC: 5 IU/L
PROLACTIN SERPL-MCNC: 59.6 NG/ML

## 2023-01-20 ENCOUNTER — NON-APPOINTMENT (OUTPATIENT)
Age: 28
End: 2023-01-20

## 2023-03-23 LAB — PROLACTIN SERPL-MCNC: 52.2 NG/ML

## 2023-03-27 ENCOUNTER — APPOINTMENT (OUTPATIENT)
Dept: ENDOCRINOLOGY | Facility: CLINIC | Age: 28
End: 2023-03-27
Payer: COMMERCIAL

## 2023-03-27 VITALS
SYSTOLIC BLOOD PRESSURE: 122 MMHG | HEART RATE: 78 BPM | WEIGHT: 129 LBS | BODY MASS INDEX: 21.49 KG/M2 | DIASTOLIC BLOOD PRESSURE: 70 MMHG | HEIGHT: 65 IN | OXYGEN SATURATION: 98 %

## 2023-03-27 DIAGNOSIS — Z13.1 ENCOUNTER FOR SCREENING FOR DIABETES MELLITUS: ICD-10-CM

## 2023-03-27 PROCEDURE — 99214 OFFICE O/P EST MOD 30 MIN: CPT

## 2023-03-27 NOTE — PHYSICAL EXAM
[Alert] : alert [No Acute Distress] : no acute distress [No Neck Mass] : no neck mass was observed [No LAD] : no lymphadenopathy [Thyroid Not Enlarged] : the thyroid was not enlarged [No Thyroid Nodules] : no palpable thyroid nodules [No Respiratory Distress] : no respiratory distress [Clear to Auscultation] : lungs were clear to auscultation bilaterally [Normal Rate] : heart rate was normal [Not Tender] : non-tender [Soft] : abdomen soft [No Stigmata of Cushings Syndrome] : no stigmata of Cushings Syndrome [Oriented x3] : oriented to person, place, and time

## 2023-03-30 LAB
ESTIMATED AVERAGE GLUCOSE: 114 MG/DL
HBA1C MFR BLD HPLC: 5.6 %
PROLACTIN SERPL-MCNC: 63.9 NG/ML

## 2023-04-03 NOTE — END OF VISIT
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  She reports regular menses.  Patient had a MRI done on December 6, 2022, there was noted below 2.  Prominent heterogeneous enhancement involving the left sella turcica and cavernous sinus region.  This corresponds to the area of tumor is seen on the preop MRI performing October 18, 2019, and could be compatible with area of residual tumor.  This measures 2.8 x 1.4 cm and thus demonstrating encasement of the cavernous segment left internal carotic artery.  She had a follow-up with Dr. Murillo and was told that there was postoperative changes.  \par Continue to increase dosage of cabergoline to 3 times weekly, 1.5 mg.\par She had a baseline echocardiogram prior to starting the medication.\par Recommend repeating echocardiogram this year.\par She reports regular menses.  Her DHEA-S level has normalized.\par We will repeat prolactin level 1 to 2 months after escalating doses of cabergoline.\par Follow-up in 6 months.

## 2023-04-03 NOTE — HISTORY OF PRESENT ILLNESS
[FreeTextEntry1] : Ms. TR MALONE is a 27 year old female with no significant PMH presents for follow up visit for macroprolactinoma. Diagnosed in Oct 2019. \par \par Patient reports first started to have symptoms when started to have irregular periods in 2016. Saw PCP in 2018. Had hormone levels checked at that time and reports prolactin was 800s at that time. Reports was told likely 2/2 possible PCOS and was being monitored. \par \par Since then saw OBGYN and prolactin was rechecked and was 2600s. 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 She was on escalating dose of Cabergoline, taking2 mg twice weekly. \par \par Patient now s/p endoscopic transnasal transsphenoidal approach to sella, removal of tumor,harvest of fat graft,use of stereotactic guidance 12/2/21. \par \par Patient was restarted on cabergoline 0.25 mg twice weekly for persistently elevated prolactin level. \par \par Following with Dr. Murillo. Repeat MRI Dec 2022 shows residual area of tumor 2.4 cm. Per progress notes, this appears to be stable. \par \par Now on Cabergoline 1.5 mg subq twice weekly (Wed/Sat) due to persistently elevated PRL levels. Periods are now regular. No galactorrhea, headaches, vision changes. No plans for pregnancy \par \par Of note, found to have mildly elevated A1c level of 5.8% with primary care doctor. Family history of diabetes.\par \par

## 2023-04-03 NOTE — ASSESSMENT
[FreeTextEntry1] : Ms. TR MALONE is a 27year old here for follow up prolactinoma. \par \par 1. Prolactinoma s/p TSA on 12/2/2021\par See pathology as above. 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 Occasional tumor cells are positive for LH and rare tumor cells are positive for GH. The Ki-67 labeling index is up to 1-2%. 1-3% tumor cell nuclei are positive for p53.\par Currently on cabergoline 0.5 mg 3 tabs (1.5 mg) Wed/Sat \par Prolactin continue to be elevated to 52 ng/mL\par Normal menses, no galactorrhea or headaches\par MRI performed Dec 2022 - shows residual tumor 2.4 cm - unchanged/scar tissue? per Dr. Murillo's note\par \par -Will increase cabergoline to 1.5 mg three times weekly, total cabergoline dose at 4.5 mg/week.. \par -Repeat prolactin level in 6 weeks \par -Check ECHO in 3 months due to high doses of Cabergoline\par -Repeat DEXA in 5 years (Dec 2027)\par -Repeat MRI in 6 months, following with Dr. Murillo - June 2023\par \par 2. Elevated DHEAS level\par May be secondary to elevated prolactin, has been normal since starting Cabergoline\par \par 3. Elevated A1c level\par Repeat A1c level today\par \par Follow-up with in 4 to 5 months.\par \par Discussed with Dr. Evans\par \par Ophelia Appiah MD\par Endocrine Fellow

## 2023-04-28 NOTE — H&P PST ADULT - BACK
Labs are back and look pretty good. HgbA1c has improved, now 7.4.  continue with treatment plan we discussed   Vitamin d is a little low. Should be taking d3 supplement 9040-0863 unit per day  Iron is a little low. Is he taking any iron supplement? Should take over the counter iron supplement every other day or few times per week, (325 mg ferrous sulfate)  Rest of lab look good.   Follow up in 3 months No deformity or limitation of movement

## 2023-05-11 LAB — PROLACTIN SERPL-MCNC: 38.1 NG/ML

## 2023-05-12 ENCOUNTER — NON-APPOINTMENT (OUTPATIENT)
Age: 28
End: 2023-05-12

## 2023-06-02 ENCOUNTER — APPOINTMENT (OUTPATIENT)
Dept: MRI IMAGING | Facility: IMAGING CENTER | Age: 28
End: 2023-06-02
Payer: COMMERCIAL

## 2023-06-02 ENCOUNTER — OUTPATIENT (OUTPATIENT)
Dept: OUTPATIENT SERVICES | Facility: HOSPITAL | Age: 28
LOS: 1 days | End: 2023-06-02
Payer: COMMERCIAL

## 2023-06-02 DIAGNOSIS — Z00.8 ENCOUNTER FOR OTHER GENERAL EXAMINATION: ICD-10-CM

## 2023-06-02 DIAGNOSIS — D35.2 BENIGN NEOPLASM OF PITUITARY GLAND: ICD-10-CM

## 2023-06-02 PROCEDURE — 70553 MRI BRAIN STEM W/O & W/DYE: CPT

## 2023-06-02 PROCEDURE — 70553 MRI BRAIN STEM W/O & W/DYE: CPT | Mod: 26

## 2023-06-02 PROCEDURE — A9585: CPT

## 2023-06-07 ENCOUNTER — OUTPATIENT (OUTPATIENT)
Dept: OUTPATIENT SERVICES | Facility: HOSPITAL | Age: 28
LOS: 1 days | End: 2023-06-07
Payer: COMMERCIAL

## 2023-06-07 ENCOUNTER — APPOINTMENT (OUTPATIENT)
Dept: CV DIAGNOSITCS | Facility: HOSPITAL | Age: 28
End: 2023-06-07

## 2023-06-07 ENCOUNTER — APPOINTMENT (OUTPATIENT)
Dept: SPINE | Facility: CLINIC | Age: 28
End: 2023-06-07
Payer: COMMERCIAL

## 2023-06-07 VITALS
BODY MASS INDEX: 21.33 KG/M2 | SYSTOLIC BLOOD PRESSURE: 96 MMHG | HEIGHT: 65 IN | OXYGEN SATURATION: 100 % | HEART RATE: 65 BPM | WEIGHT: 128 LBS | DIASTOLIC BLOOD PRESSURE: 68 MMHG

## 2023-06-07 DIAGNOSIS — D35.2 BENIGN NEOPLASM OF PITUITARY GLAND: ICD-10-CM

## 2023-06-07 PROCEDURE — 93306 TTE W/DOPPLER COMPLETE: CPT | Mod: 26

## 2023-06-07 PROCEDURE — 99213 OFFICE O/P EST LOW 20 MIN: CPT

## 2023-06-07 RX ORDER — CETIRIZINE HCL 10 MG
10 TABLET ORAL
Refills: 0 | Status: ACTIVE | COMMUNITY

## 2023-07-09 NOTE — ED POST DISCHARGE NOTE - DETAILS
12/8/21: Spoke w/ pt. Nausea improved today. Still w/ mild residual HA which is relieved w/ meds. Informed her of prolactin level. States not on cabergoline anymore as she was advised to hold med until Endo appt which she has scheduled on 12/23. Advised to f/u with endo then and reiterated f/u with Dr. Murillo. Pt acknowledged understanding, all questions answered, appreciative of call. - Cezar Butler PA-C. prosthesis

## 2023-07-12 NOTE — ED ADULT NURSE NOTE - NS ED NURSE LEVEL OF CONSCIOUSNESS SPEECH
Speaking Coherently Detail Level: Detailed Quality 226: Preventive Care And Screening: Tobacco Use: Screening And Cessation Intervention: Patient screened for tobacco use and is an ex/non-smoker

## 2023-07-24 ENCOUNTER — APPOINTMENT (OUTPATIENT)
Dept: ENDOCRINOLOGY | Facility: CLINIC | Age: 28
End: 2023-07-24
Payer: COMMERCIAL

## 2023-07-24 VITALS
DIASTOLIC BLOOD PRESSURE: 70 MMHG | WEIGHT: 129 LBS | SYSTOLIC BLOOD PRESSURE: 110 MMHG | BODY MASS INDEX: 21.49 KG/M2 | OXYGEN SATURATION: 99 % | HEIGHT: 65 IN | HEART RATE: 65 BPM

## 2023-07-24 PROCEDURE — 99214 OFFICE O/P EST MOD 30 MIN: CPT

## 2023-07-24 NOTE — HISTORY OF PRESENT ILLNESS
[FreeTextEntry1] : Ms. TR MALONE is a  26 year old female with no significant PMH presents for follow up visit for new macroprolactinoma.  Diagnosed in Oct 2019.  \par \par Patient reports first started to have symptoms when started to have irregular periods in 2016. Saw PCP in 2018. Had hormone levels checked at that time and reports prolactin was 800s at that time. Reports was told likely 2/2 possible PCOS and was being monitored. \par \par Since then saw OBGYN and prolactin was rechecked and was 2600s. 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 Reports can skip a month every 3-4 months. Spotting in between. Menarche 12. No prior history of pregnancy.   \par \par She reports feeling well, she reported that her menses had restarted since February 2020.  She has been getting monthly menstrual, about every 21 days.  \par \par MRI done in 2020, heterogenous area of enhancement is seen, has diminished since last imaging, 1.9x1.2 cm and previously measures approximately 2.3x1.9cm. She was on escalating dose of Cabergoline, taking2 mg twice weekly.  \par \par Patient now s/p endoscopic transnasal transsphenoidal approach to sella, removal of tumor,harvest of fat graft,use of stereotactic guidance 12/2/21. \par Patient was restarted on cabergoline 0.25 mg twice weekly for persistently elevated prolactin level.  Eventually increase to 0.5 mg 3 times weekly.\par \par She had a follow-up with appointment with Dr. Murillo in June 2023.  The imaging findings were reviewed and discussed with patient.  It was discussed that the patient may have recurrent prolactinoma.  It may be reasonable for her to have surgery to reexplore this area and removal of the recurrent tumor, per patient, with gamma knife therapy.  But currently she does not want to proceed with another surgery given that her prolactin levels are being controlled with cabergoline and is not having adverse effects from the medication.  Echocardiogram was done and no concern for valvulopathy.\par \par The plan is to repeat an MRI with Dr. Murillo in 6 months.\par \par \par

## 2023-07-24 NOTE — DATA REVIEWED
[FreeTextEntry1] : Patient name: TR MALONE\par YOB: 1995   Age: 27 (F)   MR#: [id number]\par Study Date: 6/7/2023\par Location: O/PSonographer: Awa Miguel RDCS\par Study quality: Technically good\par Referring Physician: Marvin Evans MD\par Blood Pressure: 87/45 mmHg\par Height: 165 cm\par Weight: 58 kg\par BSA: 1.6 m2\par Heart Rate: 92 mmHg\par ------------------------------------------------------------------------\par PROCEDURE: Transthoracic echocardiogram with 2-D, M-Mode\par and complete spectral and color flow Doppler.\par INDICATION: Palpitations (R00.2)\par ------------------------------------------------------------------------\par DIMENSIONS:\par Dimensions:     Normal Values:\par LA:     3.0 cm    2.0 - 4.0 cm\par Ao:     2.7 cm    2.0 - 3.8 cm\par SEPTUM: 0.7 cm    0.6 - 1.2 cm\par PWT:    0.7 cm    0.6 - 1.1 cm\par LVIDd:  4.7 cm    3.0 - 5.6 cm\par LVIDs:  3.2 cm    1.8 - 4.0 cm\par Derived Variables:\par LVMI: 63 g/m2\par RWT: 0.29\par Fractional short: 32 %\par Ejection Fraction (Modified Dawson Rule): 65 %\par ------------------------------------------------------------------------\par OBSERVATIONS:\par Mitral Valve: Normal mitral valve. Minimal mitral\par regurgitation.\par Aortic Root: Aortic Root: 2.7 cm.\par Ascending Aorta: 2.4 cm.\par Aortic Valve: Calcified trileaflet aortic valve with normal\par opening. No aortic valve regurgitation seen.\par Left Atrium: Normal left atrium.  LA volume index = 29\par cc/m2.\par Left Ventricle: Normal left ventricular systolic function.\par No segmental wall motion abnormalities. LVEF calculated\par using 3DE is 65%. Normal left ventricular internal\par dimensions and wall thicknesses. Normal left ventricular\par diastolic function.\par Right Heart: Normal right atrium. Normal right ventricular\par size and function. Normal tricuspid valve. Mild tricuspid\par regurgitation. Normal pulmonic valve.  Minimal pulmonic\par regurgitation.\par Pericardium/PleuraNo pericardial effusion seen.\par Hemodynamic: Estimated right ventricular systolic pressure\par equals 28 mm Hg, assuming right atrial pressure equals 10\par mm Hg, consistent with normal pulmonary pressures.\par ------------------------------------------------------------------------\par CONCLUSIONS:\par 1. Normal mitral valve. Minimal mitral regurgitation.\par 2. Normal left atrium.  LA volume index = 29 cc/m2.\par 3. Normal left ventricular internal dimensions and wall\par thicknesses.\par 4. Normal left ventricular systolic function. No segmental\par wall motion abnormalities. LVEF calculated using 3DE is\par 65%.\par 5. Normal left ventricular diastolic function.\par 6. Normal right atrium.\par 7. Normal right ventricular size and function.\par 8. Normal tricuspid valve. Mild tricuspid regurgitation.\par 9. Estimated pulmonary artery systolic pressure equals 28\par mm Hg, assuming right atrial pressure equals 10  mm Hg,\par consistent with normal pulmonary pressures.\par 10. No pericardial effusion seen.\par *** Compared with echocardiogram of 3/31/2021, no\par significant changes noted.\par ------------------------------------------------------------------------\par Confirmed on  6/7/2023 - 10:39:48 by Kellie Anderson M.D.\par \par \par Prolactin level\par 5/8/2023: Prolactin 38.1 cabergoline 1.5 mg 3 times weekly\par 3/27/2023: Prolactin 63.9\par 3/6/2023: Prolactin 52.2 cabergoline 1.5 mg twice weekly\par 12/28/2022: Prolactin 59.6\par 11/11/2022: Prolactin 84.8 cabergoline 1.0 mg twice weekly\par 7/27/2022: Prolactin 78.0\par 5/6/2022: Prolactin 74.7\par

## 2023-07-24 NOTE — ASSESSMENT
[FreeTextEntry1] : Ms. TR MALONE is a 27year old here for follow up prolactinoma. \par \par 1. Prolactinoma s/p TSA on 12/2/2021\par See pathology as above. 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 Occasional  tumor cells are positive for LH and rare tumor cells are positive for GH. The Ki-67 labeling index is up to 1-2%. 1-3% tumor cell nuclei are positive for p53.\par \par 5/8/2023: Prolactin 38.1 cabergoline 1.5 mg 3 times weekly\par 3/27/2023: Prolactin 63.9\par 3/6/2023: Prolactin 52.2 cabergoline 1.5 mg twice weekly\par 12/28/2022: Prolactin 59.6\par 11/11/2022: Prolactin 84.8 cabergoline 1.0 mg twice weekly\par 7/27/2022: Prolactin 78.0\par 5/6/2022: Prolactin 74.7\par \par Will check LH, FSH and Estrdaiol level today \par \par 2. Elevated DHEAS level\par Likely secondary to prolactinoma\par Repeat DHEA-S level.  \par \par Currently no plan for pregnancy.\par \par \par

## 2023-07-24 NOTE — PHYSICAL EXAM
[Alert] : alert [Well Nourished] : well nourished [EOMI] : extra ocular movement intact [No Lid Lag] : no lid lag [Normal Hearing] : hearing was normal [No Neck Mass] : no neck mass was observed [No Respiratory Distress] : no respiratory distress [No Accessory Muscle Use] : no accessory muscle use [Normal Rate and Effort] : normal respiratory rate and effort [No Clubbing, Cyanosis] : no clubbing  or cyanosis of the fingernails [No Motor Deficits] : the motor exam was normal [No Tremors] : no tremors [Oriented x3] : oriented to person, place, and time [Normal Affect] : the affect was normal [Normal Insight/Judgement] : insight and judgment were intact [Normal Mood] : the mood was normal [de-identified] : Unable to perform auscultations [de-identified] : Unable to perform auscultation

## 2023-08-03 ENCOUNTER — TRANSCRIPTION ENCOUNTER (OUTPATIENT)
Age: 28
End: 2023-08-03

## 2023-08-04 LAB
DHEA-SULFATE, SERUM: 216 UG/DL
ESTRADIOL SERPL-MCNC: 245 PG/ML
FSH SERPL-MCNC: 3.7 IU/L
LH SERPL-ACNC: 8.2 IU/L
PROLACTIN SERPL-MCNC: 52.4 NG/ML

## 2023-10-03 ENCOUNTER — TRANSCRIPTION ENCOUNTER (OUTPATIENT)
Age: 28
End: 2023-10-03

## 2023-10-20 ENCOUNTER — TRANSCRIPTION ENCOUNTER (OUTPATIENT)
Age: 28
End: 2023-10-20

## 2023-10-20 LAB
MONOMERIC PROLACTIN (ICMA)*: 49 NG/ML
PERCENT MACROPROLACTIN: 8 %
PROLACTIN SERPL-MCNC: 53.8 NG/ML
PROLACTIN, SERUM (ICMA)*: 53.4 NG/ML

## 2023-10-24 ENCOUNTER — TRANSCRIPTION ENCOUNTER (OUTPATIENT)
Age: 28
End: 2023-10-24

## 2023-11-13 ENCOUNTER — APPOINTMENT (OUTPATIENT)
Dept: INTERNAL MEDICINE | Facility: CLINIC | Age: 28
End: 2023-11-13

## 2023-11-14 ENCOUNTER — APPOINTMENT (OUTPATIENT)
Dept: INTERNAL MEDICINE | Facility: CLINIC | Age: 28
End: 2023-11-14
Payer: COMMERCIAL

## 2023-11-14 ENCOUNTER — OUTPATIENT (OUTPATIENT)
Dept: OUTPATIENT SERVICES | Facility: HOSPITAL | Age: 28
LOS: 1 days | End: 2023-11-14
Payer: COMMERCIAL

## 2023-11-14 VITALS
SYSTOLIC BLOOD PRESSURE: 100 MMHG | HEART RATE: 78 BPM | OXYGEN SATURATION: 99 % | BODY MASS INDEX: 21.66 KG/M2 | WEIGHT: 130 LBS | HEIGHT: 65 IN | DIASTOLIC BLOOD PRESSURE: 66 MMHG

## 2023-11-14 DIAGNOSIS — N92.6 IRREGULAR MENSTRUATION, UNSPECIFIED: ICD-10-CM

## 2023-11-14 DIAGNOSIS — K75.3 GRANULOMATOUS HEPATITIS, NOT ELSEWHERE CLASSIFIED: ICD-10-CM

## 2023-11-14 DIAGNOSIS — M51.9 UNSPECIFIED THORACIC, THORACOLUMBAR AND LUMBOSACRAL INTERVERTEBRAL DISC DISORDER: ICD-10-CM

## 2023-11-14 DIAGNOSIS — Z00.00 ENCOUNTER FOR GENERAL ADULT MEDICAL EXAMINATION W/OUT ABNORMAL FINDINGS: ICD-10-CM

## 2023-11-14 DIAGNOSIS — D35.2 BENIGN NEOPLASM OF PITUITARY GLAND: ICD-10-CM

## 2023-11-14 DIAGNOSIS — I10 ESSENTIAL (PRIMARY) HYPERTENSION: ICD-10-CM

## 2023-11-14 DIAGNOSIS — Z00.00 ENCOUNTER FOR GENERAL ADULT MEDICAL EXAMINATION WITHOUT ABNORMAL FINDINGS: ICD-10-CM

## 2023-11-14 PROCEDURE — 99395 PREV VISIT EST AGE 18-39: CPT | Mod: 25

## 2023-11-14 PROCEDURE — G0444 DEPRESSION SCREEN ANNUAL: CPT | Mod: 59

## 2023-11-14 PROCEDURE — G0442 ANNUAL ALCOHOL SCREEN 15 MIN: CPT | Mod: 59

## 2023-11-14 PROCEDURE — G0463: CPT | Mod: 25

## 2023-11-14 RX ORDER — CHLORHEXIDINE GLUCONATE 4 %
LIQUID (ML) TOPICAL DAILY
Qty: 30 | Refills: 0 | Status: ACTIVE | COMMUNITY
Start: 2023-11-14

## 2023-11-19 ENCOUNTER — TRANSCRIPTION ENCOUNTER (OUTPATIENT)
Age: 28
End: 2023-11-19

## 2023-11-19 LAB
25(OH)D3 SERPL-MCNC: 28.6 NG/ML
ALBUMIN SERPL ELPH-MCNC: 4.6 G/DL
ALP BLD-CCNC: 64 U/L
ALT SERPL-CCNC: 10 U/L
ANION GAP SERPL CALC-SCNC: 11 MMOL/L
AST SERPL-CCNC: 29 U/L
BASOPHILS # BLD AUTO: 0.06 K/UL
BASOPHILS NFR BLD AUTO: 1 %
BILIRUB SERPL-MCNC: 0.6 MG/DL
BUN SERPL-MCNC: 7 MG/DL
CALCIUM SERPL-MCNC: 9.6 MG/DL
CHLORIDE SERPL-SCNC: 107 MMOL/L
CHOLEST SERPL-MCNC: 137 MG/DL
CO2 SERPL-SCNC: 24 MMOL/L
CREAT SERPL-MCNC: 0.65 MG/DL
EGFR: 122 ML/MIN/1.73M2
EOSINOPHIL # BLD AUTO: 0.12 K/UL
EOSINOPHIL NFR BLD AUTO: 2.1 %
ESTIMATED AVERAGE GLUCOSE: 117 MG/DL
GLUCOSE SERPL-MCNC: 96 MG/DL
HBA1C MFR BLD HPLC: 5.7 %
HBV SURFACE AB SERPL IA-ACNC: 225 MIU/ML
HBV SURFACE AG SER QL: NONREACTIVE
HCT VFR BLD CALC: 36.7 %
HCV AB SER QL: NONREACTIVE
HCV S/CO RATIO: 0.08 S/CO
HDLC SERPL-MCNC: 55 MG/DL
HGB BLD-MCNC: 12 G/DL
HIV1+2 AB SPEC QL IA.RAPID: NONREACTIVE
IMM GRANULOCYTES NFR BLD AUTO: 0.3 %
LDLC SERPL CALC-MCNC: 69 MG/DL
LYMPHOCYTES # BLD AUTO: 1.95 K/UL
LYMPHOCYTES NFR BLD AUTO: 33.4 %
MAN DIFF?: NORMAL
MCHC RBC-ENTMCNC: 29.9 PG
MCHC RBC-ENTMCNC: 32.7 GM/DL
MCV RBC AUTO: 91.5 FL
MONOCYTES # BLD AUTO: 0.47 K/UL
MONOCYTES NFR BLD AUTO: 8.1 %
NEUTROPHILS # BLD AUTO: 3.21 K/UL
NEUTROPHILS NFR BLD AUTO: 55.1 %
NONHDLC SERPL-MCNC: 83 MG/DL
PLATELET # BLD AUTO: 251 K/UL
POTASSIUM SERPL-SCNC: 4.5 MMOL/L
PROT SERPL-MCNC: 7.6 G/DL
RBC # BLD: 4.01 M/UL
RBC # FLD: 13.3 %
SODIUM SERPL-SCNC: 141 MMOL/L
T4 FREE SERPL-MCNC: 1.2 NG/DL
TRIGL SERPL-MCNC: 63 MG/DL
TSH SERPL-ACNC: 1.61 UIU/ML
WBC # FLD AUTO: 5.83 K/UL

## 2023-12-09 ENCOUNTER — OUTPATIENT (OUTPATIENT)
Dept: OUTPATIENT SERVICES | Facility: HOSPITAL | Age: 28
LOS: 1 days | End: 2023-12-09
Payer: COMMERCIAL

## 2023-12-09 ENCOUNTER — APPOINTMENT (OUTPATIENT)
Dept: MRI IMAGING | Facility: IMAGING CENTER | Age: 28
End: 2023-12-09
Payer: COMMERCIAL

## 2023-12-09 DIAGNOSIS — Z00.8 ENCOUNTER FOR OTHER GENERAL EXAMINATION: ICD-10-CM

## 2023-12-09 DIAGNOSIS — D35.2 BENIGN NEOPLASM OF PITUITARY GLAND: ICD-10-CM

## 2023-12-09 PROCEDURE — 70553 MRI BRAIN STEM W/O & W/DYE: CPT | Mod: 26

## 2023-12-09 PROCEDURE — 70553 MRI BRAIN STEM W/O & W/DYE: CPT

## 2023-12-09 PROCEDURE — A9585: CPT

## 2023-12-13 ENCOUNTER — APPOINTMENT (OUTPATIENT)
Dept: SPINE | Facility: CLINIC | Age: 28
End: 2023-12-13
Payer: COMMERCIAL

## 2023-12-13 VITALS
DIASTOLIC BLOOD PRESSURE: 68 MMHG | WEIGHT: 130 LBS | BODY MASS INDEX: 21.66 KG/M2 | HEIGHT: 65 IN | OXYGEN SATURATION: 96 % | HEART RATE: 53 BPM | SYSTOLIC BLOOD PRESSURE: 109 MMHG

## 2023-12-13 PROCEDURE — 99213 OFFICE O/P EST LOW 20 MIN: CPT

## 2023-12-21 ENCOUNTER — RX RENEWAL (OUTPATIENT)
Age: 28
End: 2023-12-21

## 2024-02-26 ENCOUNTER — APPOINTMENT (OUTPATIENT)
Dept: ENDOCRINOLOGY | Facility: CLINIC | Age: 29
End: 2024-02-26
Payer: COMMERCIAL

## 2024-02-26 VITALS
BODY MASS INDEX: 21.99 KG/M2 | OXYGEN SATURATION: 98 % | WEIGHT: 132 LBS | DIASTOLIC BLOOD PRESSURE: 66 MMHG | SYSTOLIC BLOOD PRESSURE: 102 MMHG | HEIGHT: 65 IN | HEART RATE: 51 BPM

## 2024-02-26 VITALS — OXYGEN SATURATION: 98 % | HEART RATE: 75 BPM

## 2024-02-26 DIAGNOSIS — K75.3 GRANULOMATOUS HEPATITIS, NOT ELSEWHERE CLASSIFIED: ICD-10-CM

## 2024-02-26 DIAGNOSIS — R79.89 OTHER SPECIFIED ABNORMAL FINDINGS OF BLOOD CHEMISTRY: ICD-10-CM

## 2024-02-26 PROCEDURE — 99214 OFFICE O/P EST MOD 30 MIN: CPT

## 2024-02-26 NOTE — HISTORY OF PRESENT ILLNESS
[FreeTextEntry1] : Ms. TR MALONE is a  28 year old female with no significant PMH presents for follow up visit for new macroprolactinoma.  Diagnosed in Oct 2019.    Patient reports first started to have symptoms when started to have irregular periods in 2016. Saw PCP in 2018. Had hormone levels checked at that time and reports prolactin was 800s at that time. Reports was told likely 2/2 possible PCOS and was being monitored.   Since then saw OBGYN and prolactin was rechecked and was 2600s. 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  She reports feeling well, she reported that her menses had restarted since February 2020.  She has been getting monthly menstrual, about every 21 days.    MRI done in 2020, heterogenous area of enhancement is seen, has diminished since last imaging, 1.9x1.2 cm and previously measures approximately 2.3x1.9cm. She was on escalating dose of Cabergoline, taking2 mg twice weekly.    Patient now s/p endoscopic transnasal transsphenoidal approach to sella, removal of tumor, harvest of fat graft, use of stereotactic guidance 12/2/21.   She had a follow-up with appointment with Dr. Murillo in June 2023.  The imaging findings were reviewed and discussed with patient.  It was discussed that the patient may have recurrent prolactinoma.  It may be reasonable for her to have surgery to reexplore this area and removal of the recurrent tumor, per patient, with gamma knife therapy.  But currently she does not want to proceed with another surgery given that her prolactin levels are being controlled with cabergoline and is not having adverse effects from the medication.  Echocardiogram was done and no concern for valvopathy.  Had another follow up visit with Dr. Murillo in Dec 2023, plan for MRI next month in March 2024 and follow up with him afterwards.   She is currently taking Cabergoline 1.5mg three times a week.  She reports regular menses.  She reports no headache or any visual changes.  She is doing well overall.  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     The plan is to repeat an MRI with Dr. Murillo in 6 months.

## 2024-02-26 NOTE — PHYSICAL EXAM
[Alert] : alert [Well Nourished] : well nourished [EOMI] : extra ocular movement intact [No Lid Lag] : no lid lag [Normal Hearing] : hearing was normal [No Neck Mass] : no neck mass was observed [No Respiratory Distress] : no respiratory distress [No Accessory Muscle Use] : no accessory muscle use [Normal Rate and Effort] : normal respiratory rate and effort [No Clubbing, Cyanosis] : no clubbing  or cyanosis of the fingernails [No Motor Deficits] : the motor exam was normal [No Tremors] : no tremors [Oriented x3] : oriented to person, place, and time [Normal Affect] : the affect was normal [Normal Insight/Judgement] : insight and judgment were intact [Normal Mood] : the mood was normal [de-identified] : Unable to perform auscultations [de-identified] : Unable to perform auscultation

## 2024-02-26 NOTE — ASSESSMENT
[FreeTextEntry1] : Ms. TR MALONE is a 28 year old here for follow up prolactinoma.   1. 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  Pitutiary adenoma, immunotoxins are performed on block 1A and show tumor cells are positive for synaptophysin, prolactin and FSH (weak) but negative for ACTH, and TSH. Occasional  tumor cells are positive for LH and rare tumor cells are positive for GH. The Ki-67 labeling index is up to 1-2%. 1-3% tumor cell nuclei are positive for p53. Check prolactin and macroprolactin today.  Started taking Zytec about 2 months ago for allergy.  Encouraged patient to see allergist. Unlikely will affect Prolactin level  10/20/2023: Prolactin 49.0 ng/mL (monomeric), Carberogline 1.5mg, 3 times weekly  5/8/2023: Prolactin 38.1 cabergoline 1.5 mg 3 times weekly 3/27/2023: Prolactin 63.9 3/6/2023: Prolactin 52.2 cabergoline 1.5 mg twice weekly 12/28/2022: Prolactin 59.6 11/11/2022: Prolactin 84.8 cabergoline 1.0 mg twice weekly 7/27/2022: Prolactin 78.0 5/6/2022: Prolactin 74.7  2. Elevated DHEAS level Likely secondary to prolactinoma Repeat DHEA-S level.    FU in 6 months Call office if any questions or concerns.

## 2024-02-29 LAB — PROLACTIN SERPL-MCNC: 34.2 NG/ML

## 2024-03-09 ENCOUNTER — APPOINTMENT (OUTPATIENT)
Dept: MRI IMAGING | Facility: IMAGING CENTER | Age: 29
End: 2024-03-09
Payer: COMMERCIAL

## 2024-03-09 ENCOUNTER — OUTPATIENT (OUTPATIENT)
Dept: OUTPATIENT SERVICES | Facility: HOSPITAL | Age: 29
LOS: 1 days | End: 2024-03-09
Payer: COMMERCIAL

## 2024-03-09 DIAGNOSIS — D35.2 BENIGN NEOPLASM OF PITUITARY GLAND: ICD-10-CM

## 2024-03-09 PROCEDURE — 70553 MRI BRAIN STEM W/O & W/DYE: CPT

## 2024-03-09 PROCEDURE — 70553 MRI BRAIN STEM W/O & W/DYE: CPT | Mod: 26

## 2024-03-09 PROCEDURE — A9585: CPT

## 2024-03-13 ENCOUNTER — TRANSCRIPTION ENCOUNTER (OUTPATIENT)
Age: 29
End: 2024-03-13

## 2024-03-13 LAB
DHEA-SULFATE, SERUM: 164 UG/DL
MONOMERIC PROLACTIN (ICMA)*: 29.4 NG/ML
PERCENT MACROPROLACTIN: 16 %
PROLACTIN, SERUM (ICMA)*: 35.1 NG/ML

## 2024-03-20 ENCOUNTER — APPOINTMENT (OUTPATIENT)
Dept: SPINE | Facility: CLINIC | Age: 29
End: 2024-03-20
Payer: COMMERCIAL

## 2024-03-20 VITALS
SYSTOLIC BLOOD PRESSURE: 107 MMHG | BODY MASS INDEX: 21.99 KG/M2 | HEIGHT: 65 IN | DIASTOLIC BLOOD PRESSURE: 62 MMHG | HEART RATE: 64 BPM | WEIGHT: 132 LBS | OXYGEN SATURATION: 99 %

## 2024-03-20 DIAGNOSIS — D35.2 BENIGN NEOPLASM OF PITUITARY GLAND: ICD-10-CM

## 2024-03-20 PROCEDURE — 99213 OFFICE O/P EST LOW 20 MIN: CPT

## 2024-03-25 NOTE — DATA REVIEWED
[de-identified] : Brain and sella with and without contrast done on 03/09/2024 at [facility] and compared to images from 12/09/2023 and 12/03/2021.

## 2024-03-25 NOTE — REASON FOR VISIT
[Follow-Up: _____] : a [unfilled] follow-up visit [FreeTextEntry1] : The patient is here for review of a new MRI of the brain and sella with and without contrast.

## 2024-03-25 NOTE — HISTORY OF PRESENT ILLNESS
[FreeTextEntry1] : TR MALONE is a 28 year old lady who underwent an endoscopic transnasal transsphenoidal approach to the sella for removal of a prolactinoma on 12/02/2021.  The patient continues to be followed by endocrinologist, Dr. Marvin Evans.  She has remained on Cabergoline to treat elevated prolactin levels and is now taking 0.5 mg tabs, 3 tabs 3 times a week. Her prolactin level in February was 34.4.  Her MRIs have showed a heterogeneous enhancement involving the central to right side of the pituitary which has been stable.  It was discussed at her last appointment that it is possible that there may still be prolactin secreting tumor present.  She is here for review of a new MRI of the brain and sella with and without contrast.

## 2024-04-29 RX ORDER — CABERGOLINE 0.5 MG/1
0.5 TABLET ORAL
Qty: 108 | Refills: 0 | Status: ACTIVE | COMMUNITY
Start: 2021-12-28 | End: 1900-01-01

## 2024-05-29 ENCOUNTER — APPOINTMENT (OUTPATIENT)
Dept: PEDIATRIC ALLERGY IMMUNOLOGY | Facility: CLINIC | Age: 29
End: 2024-05-29
Payer: COMMERCIAL

## 2024-05-29 ENCOUNTER — LABORATORY RESULT (OUTPATIENT)
Age: 29
End: 2024-05-29

## 2024-05-29 VITALS — BODY MASS INDEX: 21.83 KG/M2 | WEIGHT: 131.2 LBS | HEART RATE: 71 BPM

## 2024-05-29 DIAGNOSIS — L30.9 DERMATITIS, UNSPECIFIED: ICD-10-CM

## 2024-05-29 DIAGNOSIS — J31.0 CHRONIC RHINITIS: ICD-10-CM

## 2024-05-29 DIAGNOSIS — L23.9 ALLERGIC CONTACT DERMATITIS, UNSPECIFIED CAUSE: ICD-10-CM

## 2024-05-29 DIAGNOSIS — H10.13 ACUTE ATOPIC CONJUNCTIVITIS, BILATERAL: ICD-10-CM

## 2024-05-29 PROCEDURE — 36415 COLL VENOUS BLD VENIPUNCTURE: CPT

## 2024-05-29 PROCEDURE — 99244 OFF/OP CNSLTJ NEW/EST MOD 40: CPT | Mod: GC

## 2024-05-29 RX ORDER — FLUTICASONE FUROATE 27.5 UG/1
27.5 SPRAY, METERED NASAL DAILY
Qty: 1 | Refills: 0 | Status: DISCONTINUED | COMMUNITY
Start: 2024-05-29 | End: 2024-05-29

## 2024-05-29 RX ORDER — FLUTICASONE PROPIONATE 50 UG/1
50 SPRAY, METERED NASAL DAILY
Qty: 1 | Refills: 0 | Status: ACTIVE | COMMUNITY
Start: 2024-05-29 | End: 1900-01-01

## 2024-05-29 RX ORDER — HYDROCORTISONE 25 MG/G
2.5 OINTMENT TOPICAL TWICE DAILY
Qty: 18 | Refills: 0 | Status: ACTIVE | COMMUNITY
Start: 2024-05-29 | End: 1900-01-01

## 2024-05-29 NOTE — REASON FOR VISIT
[Initial Consultation] : an initial consultation for [Congestion] : congestion [Itchy Eyes] : itchy eyes [Red Eyes] : red eyes

## 2024-05-29 NOTE — CONSULT LETTER
[Dear  ___] : Dear  [unfilled], [Consult Letter:] : I had the pleasure of evaluating your patient, [unfilled]. [Please see my note below.] : Please see my note below. [Consult Closing:] : Thank you very much for allowing me to participate in the care of this patient.  If you have any questions, please do not hesitate to contact me. [Sincerely,] : Sincerely, [FreeTextEntry3] : Denice Pack MD  ___________________________________ Fellow, Division of Allergy and Immunology  [facility] [employer] at [address], [address] [place] [address] Tel: [phone] Fax: [phone] Email: [email or web address]  MD PRIYANKA Ruffin FACAAI Adult and Pediatric Allergy, Asthma and Clinical Immunology Associate Professor of Medicine and Pediatrics at   [facility] [place], Adult Allergy and Immunology   [facility]   Division of Allergy, Asthma and Immunology   [address] [place] [address]   Phone [phone]  Fax [phone]

## 2024-05-29 NOTE — REVIEW OF SYSTEMS
[Nl] : Gastrointestinal [FreeTextEntry3] : SEE HPI [FreeTextEntry4] : SEE HPI [de-identified] : SEE HPI [de-identified] : SEE HPI [de-identified] : SEE HPI

## 2024-05-29 NOTE — HISTORY OF PRESENT ILLNESS
[Asthma] : asthma [Food Allergies] : food allergies [Drug Allergies] : drug allergies [de-identified] : 28 year old female with prolactinoma (s/p transphenoidal resection Dec. 2021) presents for initial evaluation of chronic rhinitis.  Rhinitis  After transphenoidal resection procedure in Dec. 2021 for pituitary adenoma, started having year round rhinitis symptoms starting in spring 2022. Has symptoms of runny nose, congestion, itchy eyes, sneezing all year round. Worse when she is at her apartment and in the spring. Does have a cat at home that she got in March 2021. Does not have allergy symptoms when she is at work and wearing a mask. Has had dogs in the past but not currently. Takes daily Zyrtec and olapataday eye drops (last used today)- has never used a nose spray. Her Endodrinologist is concerned daily Zyrtec may be elevating her Prolactin levels- despite prolactinoma resection and high doses of cabergoline her prolactin levels are still elevated.    Eczema - On her neck, started around the time the allergy symptoms stated, March 2021. Eczema is in the area of a 14k gold necklace she used to wear but she took the necklace off months ago and symptoms persisted. Does not dry clean her clothes and uses scent free detergent  -Uses castelle soap and dove sensitive skin bar soap. Eucerin cream daily, over the counter hydrocortisone as needed.  -Currently wearing gold & silver  earrings which do not bother her  -Sometimes gets dermatitis on her hands when she washes them frequently   ?Adhesive reaction Once put icy hot patch on her back and when she took it off she had an area of erythema where the patch was. Otherwise did not bother her. Also happened a second time with another patch that was also medicated. Not sure if she has had any other reactions to non-medicated adhesives

## 2024-05-29 NOTE — PHYSICAL EXAM
[Alert] : alert [Well Nourished] : well nourished [Healthy Appearance] : healthy appearance [No Acute Distress] : no acute distress [Well Developed] : well developed [Normal Pupil & Iris Size/Symmetry] : normal pupil and iris size and symmetry [No Discharge] : no discharge [No Photophobia] : no photophobia [Sclera Not Icteric] : sclera not icteric [Normal TMs] : both tympanic membranes were normal [Normal Nasal Mucosa] : the nasal mucosa was normal [Normal Lips/Tongue] : the lips and tongue were normal [Normal Outer Ear/Nose] : the ears and nose were normal in appearance [Normal Tonsils] : normal tonsils [No Thrush] : no thrush [Pale mucosa] : pale mucosa [Boggy Nasal Turbinates] : boggy and/or pale nasal turbinates [Posterior Pharyngeal Cobblestoning] : posterior pharyngeal cobblestoning [Clear Rhinorrhea] : clear rhinorrhea was seen [Normal Rate and Effort] : normal respiratory rhythm and effort [No Crackles] : no crackles [No Retractions] : no retractions [Bilateral Audible Breath Sounds] : bilateral audible breath sounds [Normal Rate] : heart rate was normal  [Normal S1, S2] : normal S1 and S2 [No murmur] : no murmur [Regular Rhythm] : with a regular rhythm [Soft] : abdomen soft [Not Tender] : non-tender [Not Distended] : not distended [No HSM] : no hepato-splenomegaly [Normal Cervical Lymph Nodes] : cervical [Patches] : ~M patches present [No clubbing] : no clubbing [No Edema] : no edema [No Cyanosis] : no cyanosis [Normal Mood] : mood was normal [Normal Affect] : affect was normal [Alert, Awake, Oriented as Age-Appropriate] : alert, awake, oriented as age appropriate [Wheezing] : no wheezing was heard [de-identified] : Eczematous rash on neck  [de-identified] : Eczematous rash on neck

## 2024-05-29 NOTE — END OF VISIT
[] : Fellow [FreeTextEntry3] : CHRONIC Rhinoconjunctivitis symptoms, rule out environmental triggers: Skin testing deferred due to recent ingestion of antihistamines. Will perform skin testing to environmental allergens next visit to identify environmental triggers. - IgE as ordered, was drawn in the office. Trial of intranasal medication as ordered. Proper technique of using nasal spray discussed and demonstrated. - Instructions to stop all antihistamines at least 5 days prior to testing provided.   atopic dermatitis, r/o contact dermatitis Bathing and skin care of DRY skin discussed with recommendations to bathe in warm water daily followed by immediate application of emollients such as Aquaphor ointment, Vaseline, CeraVe cream , as well as use of emollients several times per day.  - will consider patch testing if symptoms persist

## 2024-05-31 ENCOUNTER — NON-APPOINTMENT (OUTPATIENT)
Age: 29
End: 2024-05-31

## 2024-05-31 LAB
A ALTERNATA IGE QN: <0.1 KUA/L
A FUMIGATUS IGE QN: <0.1 KUA/L
AMER BEECH IGE QN: NORMAL
BOXELDER IGE QN: 0.23 KUA/L
C HERBARUM IGE QN: <0.1 KUA/L
C LUNATA IGE QN: <0.1 KUA/L
CAT DANDER IGE QN: 3.73 KUA/L
CEDAR IGE QN: <0.1 KUA/L
CMN PIGWEED IGE QN: <0.1 KUA/L
COCKLEBUR IGE QN: <0.1 KUA/L
COCKSFOOT IGE QN: <0.1 KUA/L
COMMON RAGWEED IGE QN: <0.1 KUA/L
COTTONWOOD IGE QN: <0.1 KUA/L
D FARINAE IGE QN: <0.1 KUA/L
D PTERONYSS IGE QN: <0.1 KUA/L
DEPRECATED A ALTERNATA IGE RAST QL: 0 (ref 0–?)
DEPRECATED A FUMIGATUS IGE RAST QL: 0 (ref 0–?)
DEPRECATED A PULLULANS IGE RAST QL: 0
DEPRECATED AMER BEECH IGE RAST QL: 0.13 KUA/L
DEPRECATED BOXELDER IGE RAST QL: NORMAL (ref 0–?)
DEPRECATED C HERBARUM IGE RAST QL: 0 (ref 0–?)
DEPRECATED C LUNATA IGE RAST QL: 0
DEPRECATED CAT DANDER IGE RAST QL: 3 (ref 0–?)
DEPRECATED CEDAR IGE RAST QL: 0
DEPRECATED COCKLEBUR IGE RAST QL: 0
DEPRECATED COCKSFOOT IGE RAST QL: 0
DEPRECATED COMMON PIGWEED IGE RAST QL: 0 (ref 0–?)
DEPRECATED COMMON RAGWEED IGE RAST QL: 0 (ref 0–?)
DEPRECATED COTTONWOOD IGE RAST QL: 0 (ref 0–?)
DEPRECATED D FARINAE IGE RAST QL: 0 (ref 0–?)
DEPRECATED D PTERONYSS IGE RAST QL: 0 (ref 0–?)
DEPRECATED DOG DANDER IGE RAST QL: NORMAL (ref 0–?)
DEPRECATED ENGL PLANTAIN IGE RAST QL: 0
DEPRECATED F MONILIFORME IGE RAST QL: 0
DEPRECATED GIANT RAGWEED IGE RAST QL: 0
DEPRECATED GOOSE FEATHER IGE RAST QL: 0
DEPRECATED GOOSEFOOT IGE RAST QL: 0 (ref 0–?)
DEPRECATED KENT BLUE GRASS IGE RAST QL: 0
DEPRECATED LONDON PLANE IGE RAST QL: 0 (ref 0–?)
DEPRECATED M RACEMOSUS IGE RAST QL: 0
DEPRECATED MUGWORT IGE RAST QL: 0 (ref 0–?)
DEPRECATED P NOTATUM IGE RAST QL: 0 (ref 0–?)
DEPRECATED R NIGRICANS IGE RAST QL: 0
DEPRECATED RED CEDAR IGE RAST QL: 0 (ref 0–?)
DEPRECATED RED TOP GRASS IGE RAST QL: 0
DEPRECATED ROACH IGE RAST QL: 0 (ref 0–?)
DEPRECATED RYE IGE RAST QL: 0
DEPRECATED SALTWORT IGE RAST QL: 0
DEPRECATED SILVER BIRCH IGE RAST QL: 0 (ref 0–?)
DEPRECATED TIMOTHY IGE RAST QL: 0 (ref 0–?)
DEPRECATED WHITE ASH IGE RAST QL: 0 (ref 0–?)
DEPRECATED WHITE HICKORY IGE RAST QL: 0
DEPRECATED WHITE OAK IGE RAST QL: 2 (ref 0–?)
DOG DANDER IGE QN: 0.23 KUA/L
ENGL PLANTAIN IGE QN: <0.1 KUA/L
F MONILIFORME IGE QN: <0.1 KUA/L
GIANT RAGWEED IGE QN: <0.1 KUA/L
GOOSE FEATHER IGE QN: <0.1 KUA/L
GOOSEFOOT IGE QN: <0.1 KUA/L
GRAY ALDER (T2) CLASS: 0
GRAY ALDER (T2) CONC: <0.1 KUA/L
KENT BLUE GRASS IGE QN: <0.1 KUA/L
LONDON PLANE IGE QN: <0.1 KUA/L
M RACEMOSUS IGE QN: <0.1 KUA/L
MOLD (AUREOBASIDIUM M12) CONC: <0.1 KUA/L
MUGWORT IGE QN: <0.1 KUA/L
MULBERRY (T70) CLASS: 0 (ref 0–?)
MULBERRY (T70) CONC: <0.1 KUA/L
P NOTATUM IGE QN: <0.1 KUA/L
R NIGRICANS IGE QN: <0.1 KUA/L
RED CEDAR IGE QN: <0.1 KUA/L
RED TOP GRASS IGE QN: <0.1 KUA/L
ROACH IGE QN: <0.1 KUA/L
RYE IGE QN: <0.1 KUA/L
SALTWORT IGE QN: <0.1 KUA/L
SILVER BIRCH IGE QN: <0.1 KUA/L
TIMOTHY IGE QN: <0.1 KUA/L
TOTAL IGE SMQN RAST: 60 KU/L
WHITE ASH IGE QN: <0.1 KUA/L
WHITE ELM IGE QN: 0 (ref 0–?)
WHITE ELM IGE QN: <0.1 KUA/L
WHITE HICKORY IGE QN: <0.1 KUA/L
WHITE OAK IGE QN: 0.7 KUA/L

## 2024-08-12 ENCOUNTER — APPOINTMENT (OUTPATIENT)
Dept: PEDIATRIC ALLERGY IMMUNOLOGY | Facility: CLINIC | Age: 29
End: 2024-08-12
Payer: COMMERCIAL

## 2024-08-12 VITALS
BODY MASS INDEX: 21.85 KG/M2 | WEIGHT: 131.13 LBS | DIASTOLIC BLOOD PRESSURE: 64 MMHG | HEIGHT: 65 IN | HEART RATE: 82 BPM | SYSTOLIC BLOOD PRESSURE: 92 MMHG | OXYGEN SATURATION: 98 %

## 2024-08-12 DIAGNOSIS — L30.9 DERMATITIS, UNSPECIFIED: ICD-10-CM

## 2024-08-12 DIAGNOSIS — J30.89 OTHER ALLERGIC RHINITIS: ICD-10-CM

## 2024-08-12 DIAGNOSIS — J30.1 ALLERGIC RHINITIS DUE TO POLLEN: ICD-10-CM

## 2024-08-12 DIAGNOSIS — J30.81 ALLERGIC RHINITIS DUE TO ANIMAL (CAT) (DOG) HAIR AND DANDER: ICD-10-CM

## 2024-08-12 DIAGNOSIS — L23.9 ALLERGIC CONTACT DERMATITIS, UNSPECIFIED CAUSE: ICD-10-CM

## 2024-08-12 PROCEDURE — 99214 OFFICE O/P EST MOD 30 MIN: CPT | Mod: 25

## 2024-08-12 PROCEDURE — 95024 IQ TESTS W/ALLERGENIC XTRCS: CPT

## 2024-08-12 PROCEDURE — 95004 PERQ TESTS W/ALRGNC XTRCS: CPT

## 2024-08-12 NOTE — IMPRESSION
[_____] : trees ([unfilled]) [Allergy Testing Mixed Feathers] : feathers [Allergy Testing Cockroach] : cockroach [Allergy Testing Dog] : dog [Allergy Testing Cat] : cat [] : molds [Allergy Testing Weeds] : weeds [Allergy Testing Grasses] : grasses [FreeTextEntry3] : dust mites, cat, dog and mold mix B

## 2024-08-12 NOTE — PHYSICAL EXAM
[Alert] : alert [Well Nourished] : well nourished [Healthy Appearance] : healthy appearance [No Acute Distress] : no acute distress [Sclera Not Icteric] : sclera not icteric [Normal Lips/Tongue] : the lips and tongue were normal [Normal Tonsils] : normal tonsils [No Thrush] : no thrush [Posterior Pharyngeal Cobblestoning] : posterior pharyngeal cobblestoning [Normal Rate and Effort] : normal respiratory rhythm and effort [No Crackles] : no crackles [Bilateral Audible Breath Sounds] : bilateral audible breath sounds [Normal Rate] : heart rate was normal  [Normal S1, S2] : normal S1 and S2 [Regular Rhythm] : with a regular rhythm [Patches] : ~M patches present [Conjunctival Erythema] : no conjunctival erythema [Pale mucosa] : no pale mucosa [Boggy Nasal Turbinates] : no boggy and/or pale nasal turbinates [Pharyngeal erythema] : no pharyngeal erythema [Exudate] : no exudate [Wheezing] : no wheezing was heard

## 2024-09-10 ENCOUNTER — APPOINTMENT (OUTPATIENT)
Dept: ENDOCRINOLOGY | Facility: CLINIC | Age: 29
End: 2024-09-10
Payer: COMMERCIAL

## 2024-09-10 VITALS
BODY MASS INDEX: 21.16 KG/M2 | OXYGEN SATURATION: 99 % | SYSTOLIC BLOOD PRESSURE: 102 MMHG | WEIGHT: 127 LBS | HEART RATE: 83 BPM | DIASTOLIC BLOOD PRESSURE: 82 MMHG | HEIGHT: 65 IN

## 2024-09-10 DIAGNOSIS — D35.2 BENIGN NEOPLASM OF PITUITARY GLAND: ICD-10-CM

## 2024-09-10 PROCEDURE — 99214 OFFICE O/P EST MOD 30 MIN: CPT

## 2024-09-10 NOTE — ASSESSMENT
[FreeTextEntry1] : Ms. TR MALONE is a 28 year old here for follow up prolactinoma.   1. 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  Pitutiary adenoma, immunotoxins are performed on block 1A and show tumor cells are positive for synaptophysin, prolactin and FSH (weak) but negative for ACTH, and TSH. Occasional  tumor cells are positive for LH and rare tumor cells are positive for GH. The Ki-67 labeling index is up to 1-2%. 1-3% tumor cell nuclei are positive for p53. Check prolactin and macroprolactin today.  2/26/2024: Prolactin 34.2, cabergoline 1.5 mg 3 times weekly. 10/20/2023: Prolactin 49.0 ng/mL (monomeric), Carberogline 1.5mg, 3 times weekly  5/8/2023: Prolactin 38.1 cabergoline 1.5 mg 3 times weekly 3/27/2023: Prolactin 63.9 3/6/2023: Prolactin 52.2 cabergoline 1.5 mg twice weekly 12/28/2022: Prolactin 59.6 11/11/2022: Prolactin 84.8 cabergoline 1.0 mg twice weekly 7/27/2022: Prolactin 78.0 5/6/2022: Prolactin 74.7 Will follow-up with Dr. Murillo in December 2024. Will repeat prolactin, monomeric prolactin level today Will likely need a MRI of the pituitary gland prior to appointment with Dr. Murillo which the neurosurgery office will schedule for.  2. Elevated DHEAS level Likely secondary to prolactinoma Repeat DHEA-S level.    Follow-up with patient 6 months after her visit with Dr. Murillo. Will likely repeat blood work for prolactin sooner than 6 months.

## 2024-09-10 NOTE — HISTORY OF PRESENT ILLNESS
[FreeTextEntry1] : Ms. TR MALONE is a  28 year old female with no significant PMH presents for follow up visit for new macroprolactinoma.  Diagnosed in Oct 2019.    Patient reports first started to have symptoms when started to have irregular periods in 2016. Saw PCP in 2018. Had hormone levels checked at that time and reports prolactin was 800s at that time. Reports was told likely 2/2 possible PCOS and was being monitored.   Since then saw OBGYN and prolactin was rechecked and was 2600s. 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  She reports feeling well, she reported that her menses had restarted since February 2020.  She has been getting monthly menstrual, about every 21 days.    MRI done in 2020, heterogenous area of enhancement is seen, has diminished since last imaging, 1.9x1.2 cm and previously measures approximately 2.3x1.9cm. She was on escalating dose of Cabergoline, taking2 mg twice weekly.    Patient now s/p endoscopic transnasal transsphenoidal approach to sella, removal of tumor, harvest of fat graft, use of stereotactic guidance 12/2/21.   She had a follow-up with appointment with Dr. Murillo in June 2023.  The imaging findings were reviewed and discussed with patient.  It was discussed that the patient may have recurrent prolactinoma.  It may be reasonable for her to have surgery to reexplore this area and removal of the recurrent tumor, per patient, with gamma knife therapy.  But currently she does not want to proceed with another surgery given that her prolactin levels are being controlled with cabergoline and is not having adverse effects from the medication.  Echocardiogram was done and no concern for valvopathy.  Had another follow up visit with Dr. Murillo in Dec 2023, plan for MRI next month in March 2024 and follow up with him afterwards.   She is currently taking Cabergoline 1.5mg three times a week.  She reports regular menses.  She reports no headache or any visual changes.  She is doing well overall.  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   The plan is to repeat an MRI with Dr. Murillo in 6 months.

## 2024-09-10 NOTE — PHYSICAL EXAM
[Alert] : alert [Well Nourished] : well nourished [EOMI] : extra ocular movement intact [No Lid Lag] : no lid lag [Normal Hearing] : hearing was normal [No Neck Mass] : no neck mass was observed [No Respiratory Distress] : no respiratory distress [No Accessory Muscle Use] : no accessory muscle use [Normal Rate and Effort] : normal respiratory rate and effort [No Clubbing, Cyanosis] : no clubbing  or cyanosis of the fingernails [No Motor Deficits] : the motor exam was normal [No Tremors] : no tremors [Oriented x3] : oriented to person, place, and time [Normal Affect] : the affect was normal [Normal Insight/Judgement] : insight and judgment were intact [Normal Mood] : the mood was normal [de-identified] : Unable to perform auscultations [de-identified] : Unable to perform auscultation

## 2024-09-13 LAB — PROLACTIN SERPL-MCNC: 39.6 NG/ML

## 2024-09-16 ENCOUNTER — APPOINTMENT (OUTPATIENT)
Dept: INTERNAL MEDICINE | Facility: CLINIC | Age: 29
End: 2024-09-16
Payer: COMMERCIAL

## 2024-09-16 ENCOUNTER — OUTPATIENT (OUTPATIENT)
Dept: OUTPATIENT SERVICES | Facility: HOSPITAL | Age: 29
LOS: 1 days | End: 2024-09-16
Payer: COMMERCIAL

## 2024-09-16 VITALS
DIASTOLIC BLOOD PRESSURE: 66 MMHG | HEART RATE: 67 BPM | OXYGEN SATURATION: 99 % | SYSTOLIC BLOOD PRESSURE: 90 MMHG | HEIGHT: 65 IN | BODY MASS INDEX: 21.83 KG/M2 | WEIGHT: 131 LBS

## 2024-09-16 DIAGNOSIS — I10 ESSENTIAL (PRIMARY) HYPERTENSION: ICD-10-CM

## 2024-09-16 DIAGNOSIS — E55.9 VITAMIN D DEFICIENCY, UNSPECIFIED: ICD-10-CM

## 2024-09-16 DIAGNOSIS — Z13.1 ENCOUNTER FOR SCREENING FOR DIABETES MELLITUS: ICD-10-CM

## 2024-09-16 DIAGNOSIS — R79.89 OTHER SPECIFIED ABNORMAL FINDINGS OF BLOOD CHEMISTRY: ICD-10-CM

## 2024-09-16 DIAGNOSIS — J31.0 CHRONIC RHINITIS: ICD-10-CM

## 2024-09-16 PROCEDURE — 90656 IIV3 VACC NO PRSV 0.5 ML IM: CPT

## 2024-09-16 PROCEDURE — G0463: CPT

## 2024-09-16 PROCEDURE — 99395 PREV VISIT EST AGE 18-39: CPT

## 2024-09-16 PROCEDURE — G0008: CPT

## 2024-09-16 NOTE — PHYSICAL EXAM
[Well Nourished] : well nourished [Well Developed] : well developed [Well-Appearing] : well-appearing [Normal Sclera/Conjunctiva] : normal sclera/conjunctiva [PERRL] : pupils equal round and reactive to light [EOMI] : extraocular movements intact [Normal Outer Ear/Nose] : the outer ears and nose were normal in appearance [Normal Oropharynx] : the oropharynx was normal [No Lymphadenopathy] : no lymphadenopathy [No JVD] : no jugular venous distention [Supple] : supple [Thyroid Normal, No Nodules] : the thyroid was normal and there were no nodules present [No Respiratory Distress] : no respiratory distress  [No Accessory Muscle Use] : no accessory muscle use [Clear to Auscultation] : lungs were clear to auscultation bilaterally [Normal Rate] : normal rate  [Regular Rhythm] : with a regular rhythm [Normal S1, S2] : normal S1 and S2 [No Carotid Bruits] : no carotid bruits [No Murmur] : no murmur heard [No Abdominal Bruit] : a ~M bruit was not heard ~T in the abdomen [No Varicosities] : no varicosities [Pedal Pulses Present] : the pedal pulses are present [No Edema] : there was no peripheral edema [No Palpable Aorta] : no palpable aorta [No Extremity Clubbing/Cyanosis] : no extremity clubbing/cyanosis [Normal Appearance] : normal in appearance [No Nipple Discharge] : no nipple discharge [No Axillary Lymphadenopathy] : no axillary lymphadenopathy [Soft] : abdomen soft [Non Tender] : non-tender [Non-distended] : non-distended [No Masses] : no abdominal mass palpated [No HSM] : no HSM [Normal Bowel Sounds] : normal bowel sounds [Normal] : soft, non-tender, non-distended, no masses palpated, no HSM and normal bowel sounds [No CVA Tenderness] : no CVA  tenderness [No Joint Swelling] : no joint swelling [No Spinal Tenderness] : no spinal tenderness [Grossly Normal Strength/Tone] : grossly normal strength/tone [No Rash] : no rash [Coordination Grossly Intact] : coordination grossly intact [No Focal Deficits] : no focal deficits [Normal Gait] : normal gait [Normal Affect] : the affect was normal [Normal Insight/Judgement] : insight and judgment were intact

## 2024-09-16 NOTE — PHYSICAL EXAM
[Well Nourished] : well nourished [Well Developed] : well developed [Well-Appearing] : well-appearing [Normal Sclera/Conjunctiva] : normal sclera/conjunctiva [PERRL] : pupils equal round and reactive to light [EOMI] : extraocular movements intact [Normal Outer Ear/Nose] : the outer ears and nose were normal in appearance [Normal Oropharynx] : the oropharynx was normal [No JVD] : no jugular venous distention [No Lymphadenopathy] : no lymphadenopathy [Supple] : supple [Thyroid Normal, No Nodules] : the thyroid was normal and there were no nodules present [No Respiratory Distress] : no respiratory distress  [No Accessory Muscle Use] : no accessory muscle use [Clear to Auscultation] : lungs were clear to auscultation bilaterally [Normal Rate] : normal rate  [Regular Rhythm] : with a regular rhythm [Normal S1, S2] : normal S1 and S2 [No Carotid Bruits] : no carotid bruits [No Murmur] : no murmur heard [No Abdominal Bruit] : a ~M bruit was not heard ~T in the abdomen [No Varicosities] : no varicosities [Pedal Pulses Present] : the pedal pulses are present [No Edema] : there was no peripheral edema [No Palpable Aorta] : no palpable aorta [No Extremity Clubbing/Cyanosis] : no extremity clubbing/cyanosis [Normal Appearance] : normal in appearance [No Nipple Discharge] : no nipple discharge [No Axillary Lymphadenopathy] : no axillary lymphadenopathy [Soft] : abdomen soft [Non Tender] : non-tender [Non-distended] : non-distended [No Masses] : no abdominal mass palpated [No HSM] : no HSM [Normal Bowel Sounds] : normal bowel sounds [Normal] : soft, non-tender, non-distended, no masses palpated, no HSM and normal bowel sounds [No CVA Tenderness] : no CVA  tenderness [No Joint Swelling] : no joint swelling [No Spinal Tenderness] : no spinal tenderness [Grossly Normal Strength/Tone] : grossly normal strength/tone [No Rash] : no rash [Coordination Grossly Intact] : coordination grossly intact [No Focal Deficits] : no focal deficits [Normal Gait] : normal gait [Normal Affect] : the affect was normal [Normal Insight/Judgement] : insight and judgment were intact

## 2024-09-17 LAB
25(OH)D3 SERPL-MCNC: 23.3 NG/ML
ALBUMIN SERPL ELPH-MCNC: 4.5 G/DL
ALP BLD-CCNC: 60 U/L
ALT SERPL-CCNC: 8 U/L
ANION GAP SERPL CALC-SCNC: 11 MMOL/L
AST SERPL-CCNC: 23 U/L
BILIRUB SERPL-MCNC: 0.6 MG/DL
BUN SERPL-MCNC: 9 MG/DL
CALCIUM SERPL-MCNC: 9 MG/DL
CHLORIDE SERPL-SCNC: 108 MMOL/L
CHOLEST SERPL-MCNC: 129 MG/DL
CO2 SERPL-SCNC: 23 MMOL/L
CREAT SERPL-MCNC: 0.63 MG/DL
EGFR: 123 ML/MIN/1.73M2
ESTIMATED AVERAGE GLUCOSE: 111 MG/DL
GLUCOSE SERPL-MCNC: 93 MG/DL
HBA1C MFR BLD HPLC: 5.5 %
HCT VFR BLD CALC: 36.2 %
HDLC SERPL-MCNC: 58 MG/DL
HGB BLD-MCNC: 11.5 G/DL
LDLC SERPL CALC-MCNC: 62 MG/DL
MCHC RBC-ENTMCNC: 29.9 PG
MCHC RBC-ENTMCNC: 31.8 GM/DL
MCV RBC AUTO: 94 FL
NONHDLC SERPL-MCNC: 71 MG/DL
PLATELET # BLD AUTO: 252 K/UL
POTASSIUM SERPL-SCNC: 4.4 MMOL/L
PROT SERPL-MCNC: 7.2 G/DL
RBC # BLD: 3.85 M/UL
RBC # FLD: 13.9 %
SODIUM SERPL-SCNC: 141 MMOL/L
TRIGL SERPL-MCNC: 31 MG/DL
TSH SERPL-ACNC: 0.89 UIU/ML
WBC # FLD AUTO: 5.89 K/UL

## 2024-09-20 LAB — DHEA-SULFATE, SERUM: 193 UG/DL

## 2024-09-21 LAB
MONOMERIC PROLACTIN (ICMA)*: 35.8 NG/ML
PERCENT MACROPROLACTIN: 18 %
PROLACTIN, SERUM (ICMA)*: 43.6 NG/ML

## 2024-09-22 NOTE — ASSESSMENT
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 -PHQ2, SBIRT: negative.   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 Evans, Endo 7/24/23. 10/6/2023: Prolactin 53.8, Continue take cabergoline 1.5 mg 3 times weekly w/o A/E.   -check lab as planned. -F/up in 1 year or prn. f/u with gyn kevin

## 2024-09-22 NOTE — HISTORY OF PRESENT ILLNESS
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 She works at [employer] [place]   and lives with her boyfriend.  Hx severe seasonal allergy. Used zyrtec for a while, with etensive testing by allergist.   She has occasionl constipation and LBP, but manageable.  Had  HIV test to make sure after the needle stick 2022. She incurred a needle stick to used SQ sharp and quickly washed her hand and visited [facility]. The source pt was negative. Her post expousure baseline test was negative.  Saw GYN: Lori Dean., last 2021 Will find out a new GYN who is not her coworker at her job for privacy. Her menses got more regular and lasts for 7 days. Denies hx of STD, HPV.  -Exercise: barely has time, very physical work, hx of chornic LBP PT for 6 month 2 years ago. -Diet: trying to eat healthy. -Social: occasional drinking, never smoked, no illicit drug use.

## 2024-09-22 NOTE — HEALTH RISK ASSESSMENT
[Excellent] : ~his/her~  mood as  excellent [Yes] : Yes [Monthly or less (1 pt)] : Monthly or less (1 point) [1 or 2 (0 pts)] : 1 or 2 (0 points) [Never (0 pts)] : Never (0 points) [No] : In the past 12 months have you used drugs other than those required for medical reasons? No [No falls in past year] : Patient reported no falls in the past year [0] : 2) Feeling down, depressed, or hopeless: Not at all (0) [PHQ-2 Negative - No further assessment needed] : PHQ-2 Negative - No further assessment needed [Never] : Never [Fully functional (bathing, dressing, toileting, transferring, walking, feeding)] : Fully functional (bathing, dressing, toileting, transferring, walking, feeding) [Fully functional (using the telephone, shopping, preparing meals, housekeeping, doing laundry, using] : Fully functional and needs no help or supervision to perform IADLs (using the telephone, shopping, preparing meals, housekeeping, doing laundry, using transportation, managing medications and managing finances) [Smoke Detector] : smoke detector [Seat Belt] :  uses seat belt [With Patient/Caregiver] : , with patient/caregiver [de-identified] : see hpi [Audit-CScore] : 1 [de-identified] : see hpi [de-identified] : see hpi [[id number]] : 0 [Reports changes in hearing] : Reports no changes in hearing [Reports changes in vision] : Reports no changes in vision [Reports changes in dental health] : Reports no changes in dental health [AdvancecareDate] : 11/23

## 2024-09-22 NOTE — HISTORY OF PRESENT ILLNESS
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 She works at [employer] [place]   and lives with her boyfriend.  Hx severe seasonal allergy. Used zyrtec for a while, with etensive testing by allergist.   She has occasionl constipation and LBP, but manageable.  Had  HIV test to make sure after the needle stick 2022. She incurred a needle stick to used SQ sharp and quickly washed her hand and visited [facility]. The source pt was negative. Her post expousure baseline test was negative.  Saw GYN: Lori Dean., last 2021 Will find out a new GYN who is not her coworker at her job for privacy. Her menses got more regular and lasts for 7 days. Denies hx of STD, HPV.  -Exercise: barely has time, very physical work, hx of chornic LBP PT for 6 month 2 years ago. -Diet: trying to eat healthy. -Social: occasional drinking, never smoked, no illicit drug use.

## 2024-09-22 NOTE — HEALTH RISK ASSESSMENT
[Excellent] : ~his/her~  mood as  excellent [Yes] : Yes [Monthly or less (1 pt)] : Monthly or less (1 point) [1 or 2 (0 pts)] : 1 or 2 (0 points) [Never (0 pts)] : Never (0 points) [No] : In the past 12 months have you used drugs other than those required for medical reasons? No [No falls in past year] : Patient reported no falls in the past year [0] : 2) Feeling down, depressed, or hopeless: Not at all (0) [PHQ-2 Negative - No further assessment needed] : PHQ-2 Negative - No further assessment needed [Never] : Never [Fully functional (bathing, dressing, toileting, transferring, walking, feeding)] : Fully functional (bathing, dressing, toileting, transferring, walking, feeding) [Fully functional (using the telephone, shopping, preparing meals, housekeeping, doing laundry, using] : Fully functional and needs no help or supervision to perform IADLs (using the telephone, shopping, preparing meals, housekeeping, doing laundry, using transportation, managing medications and managing finances) [Smoke Detector] : smoke detector [Seat Belt] :  uses seat belt [With Patient/Caregiver] : , with patient/caregiver [de-identified] : see hpi [Audit-CScore] : 1 [de-identified] : see hpi [de-identified] : see hpi [[id number]] : 0 [Reports changes in hearing] : Reports no changes in hearing [Reports changes in vision] : Reports no changes in vision [Reports changes in dental health] : Reports no changes in dental health [AdvancecareDate] : 11/23

## 2024-09-30 DIAGNOSIS — Z13.1 ENCOUNTER FOR SCREENING FOR DIABETES MELLITUS: ICD-10-CM

## 2024-09-30 DIAGNOSIS — E55.9 VITAMIN D DEFICIENCY, UNSPECIFIED: ICD-10-CM

## 2024-09-30 DIAGNOSIS — R79.89 OTHER SPECIFIED ABNORMAL FINDINGS OF BLOOD CHEMISTRY: ICD-10-CM

## 2024-09-30 DIAGNOSIS — Z00.00 ENCOUNTER FOR GENERAL ADULT MEDICAL EXAMINATION WITHOUT ABNORMAL FINDINGS: ICD-10-CM

## 2024-09-30 DIAGNOSIS — Z23 ENCOUNTER FOR IMMUNIZATION: ICD-10-CM

## 2024-09-30 DIAGNOSIS — J31.0 CHRONIC RHINITIS: ICD-10-CM

## 2024-10-15 ENCOUNTER — APPOINTMENT (OUTPATIENT)
Dept: DERMATOLOGY | Facility: CLINIC | Age: 29
End: 2024-10-15
Payer: COMMERCIAL

## 2024-10-15 VITALS — WEIGHT: 131 LBS | HEIGHT: 65 IN | BODY MASS INDEX: 21.83 KG/M2

## 2024-10-15 DIAGNOSIS — L50.3 DERMATOGRAPHIC URTICARIA: ICD-10-CM

## 2024-10-15 DIAGNOSIS — L21.9 SEBORRHEIC DERMATITIS, UNSPECIFIED: ICD-10-CM

## 2024-10-15 PROCEDURE — 99204 OFFICE O/P NEW MOD 45 MIN: CPT

## 2024-10-15 RX ORDER — TACROLIMUS 1 MG/G
0.1 OINTMENT TOPICAL
Qty: 1 | Refills: 5 | Status: ACTIVE | COMMUNITY
Start: 2024-10-15 | End: 1900-01-01

## 2024-10-15 RX ORDER — MOMETASONE FUROATE 1 MG/ML
0.1 SOLUTION TOPICAL
Qty: 1 | Refills: 2 | Status: ACTIVE | COMMUNITY
Start: 2024-10-15 | End: 1900-01-01

## 2024-11-26 ENCOUNTER — TRANSCRIPTION ENCOUNTER (OUTPATIENT)
Age: 29
End: 2024-11-26

## 2024-12-12 ENCOUNTER — OUTPATIENT (OUTPATIENT)
Dept: OUTPATIENT SERVICES | Facility: HOSPITAL | Age: 29
LOS: 1 days | End: 2024-12-12
Payer: COMMERCIAL

## 2024-12-12 ENCOUNTER — APPOINTMENT (OUTPATIENT)
Dept: MRI IMAGING | Facility: IMAGING CENTER | Age: 29
End: 2024-12-12
Payer: COMMERCIAL

## 2024-12-12 DIAGNOSIS — D35.2 BENIGN NEOPLASM OF PITUITARY GLAND: ICD-10-CM

## 2024-12-12 DIAGNOSIS — Z00.8 ENCOUNTER FOR OTHER GENERAL EXAMINATION: ICD-10-CM

## 2024-12-12 PROCEDURE — A9585: CPT

## 2024-12-12 PROCEDURE — 70553 MRI BRAIN STEM W/O & W/DYE: CPT

## 2024-12-12 PROCEDURE — 70553 MRI BRAIN STEM W/O & W/DYE: CPT | Mod: 26

## 2024-12-16 ENCOUNTER — APPOINTMENT (OUTPATIENT)
Dept: MRI IMAGING | Facility: IMAGING CENTER | Age: 29
End: 2024-12-16

## 2024-12-18 ENCOUNTER — APPOINTMENT (OUTPATIENT)
Dept: SPINE | Facility: CLINIC | Age: 29
End: 2024-12-18
Payer: COMMERCIAL

## 2024-12-18 VITALS
HEIGHT: 65 IN | BODY MASS INDEX: 21.83 KG/M2 | WEIGHT: 131 LBS | OXYGEN SATURATION: 98 % | SYSTOLIC BLOOD PRESSURE: 91 MMHG | HEART RATE: 62 BPM | DIASTOLIC BLOOD PRESSURE: 66 MMHG

## 2024-12-18 DIAGNOSIS — R79.89 OTHER SPECIFIED ABNORMAL FINDINGS OF BLOOD CHEMISTRY: ICD-10-CM

## 2024-12-18 DIAGNOSIS — D35.2 BENIGN NEOPLASM OF PITUITARY GLAND: ICD-10-CM

## 2024-12-18 PROCEDURE — 99213 OFFICE O/P EST LOW 20 MIN: CPT

## 2025-02-06 NOTE — PATIENT PROFILE ADULT - NSPROMEDSADMININFO_GEN_A_NUR
matter how old you are, how fit you are, or what health problems you have, there is a form of activity that will work for you. And the more physical activity you can do, the better your overall health will be.  What kinds of activity can help you stay healthy?  Being more active will make your daily activities easier. Physical activity includes planned exercise and things you do in daily life. There are four types of activity:  Aerobic.  Doing aerobic activity makes your heart and lungs strong.  Includes walking, dancing, and gardening.  Aim for at least 2½ hours spread throughout the week.  It improves your energy and can help you sleep better.  Muscle-strengthening.  This type of activity can help maintain muscle and strengthen bones.  Includes climbing stairs, using resistance bands, and lifting or carrying heavy loads.  Aim for at least twice a week.  It can help protect the knees and other joints.  Stretching.  Stretching gives you better range of motion in joints and muscles.  Includes upper arm stretches, calf stretches, and gentle yoga.  Aim for at least twice a week, preferably after your muscles are warmed up from other activities.  It can help you function better in daily life.  Balancing.  This helps you stay coordinated and have good posture.  Includes heel-to-toe walking, mikala chi, and certain types of yoga.  Aim for at least 3 days a week.  It can reduce your risk of falling.  Even if you have a hard time meeting the recommendations, it's better to be more active than less active. All activity done in each category counts toward your weekly total. You'd be surprised how daily things like carrying groceries, keeping up with grandchildren, and taking the stairs can add up.  What keeps you from being active?  If you've had a hard time being more active, you're not alone. Maybe you remember being able to do more. Or maybe you've never thought of yourself as being active. It's frustrating when you can't do 
no concerns

## 2025-03-06 ENCOUNTER — TRANSCRIPTION ENCOUNTER (OUTPATIENT)
Age: 30
End: 2025-03-06

## 2025-03-14 ENCOUNTER — TRANSCRIPTION ENCOUNTER (OUTPATIENT)
Age: 30
End: 2025-03-14

## 2025-03-14 LAB — PROLACTIN SERPL-MCNC: 52.2 NG/ML

## 2025-03-17 ENCOUNTER — TRANSCRIPTION ENCOUNTER (OUTPATIENT)
Age: 30
End: 2025-03-17

## 2025-04-04 ENCOUNTER — APPOINTMENT (OUTPATIENT)
Dept: OBGYN | Facility: CLINIC | Age: 30
End: 2025-04-04
Payer: COMMERCIAL

## 2025-04-04 ENCOUNTER — NON-APPOINTMENT (OUTPATIENT)
Age: 30
End: 2025-04-04

## 2025-04-04 VITALS — DIASTOLIC BLOOD PRESSURE: 65 MMHG | BODY MASS INDEX: 21.63 KG/M2 | WEIGHT: 130 LBS | SYSTOLIC BLOOD PRESSURE: 100 MMHG

## 2025-04-04 DIAGNOSIS — Z01.419 ENCOUNTER FOR GYNECOLOGICAL EXAMINATION (GENERAL) (ROUTINE) W/OUT ABNORMAL FINDINGS: ICD-10-CM

## 2025-04-04 PROCEDURE — 99385 PREV VISIT NEW AGE 18-39: CPT

## 2025-04-04 PROCEDURE — G0444 DEPRESSION SCREEN ANNUAL: CPT | Mod: 59

## 2025-04-07 LAB
C TRACH RRNA SPEC QL NAA+PROBE: NOT DETECTED
HPV HIGH+LOW RISK DNA PNL CVX: NOT DETECTED
N GONORRHOEA RRNA SPEC QL NAA+PROBE: NOT DETECTED
SOURCE TP AMPLIFICATION: NORMAL

## 2025-04-09 ENCOUNTER — TRANSCRIPTION ENCOUNTER (OUTPATIENT)
Age: 30
End: 2025-04-09

## 2025-04-09 LAB — CYTOLOGY CVX/VAG DOC THIN PREP: NORMAL

## 2025-06-09 ENCOUNTER — APPOINTMENT (OUTPATIENT)
Dept: ENDOCRINOLOGY | Facility: CLINIC | Age: 30
End: 2025-06-09
Payer: COMMERCIAL

## 2025-06-09 VITALS
DIASTOLIC BLOOD PRESSURE: 64 MMHG | OXYGEN SATURATION: 98 % | SYSTOLIC BLOOD PRESSURE: 102 MMHG | WEIGHT: 130 LBS | BODY MASS INDEX: 21.66 KG/M2 | HEIGHT: 65 IN | HEART RATE: 67 BPM

## 2025-06-09 PROCEDURE — 99214 OFFICE O/P EST MOD 30 MIN: CPT | Mod: GC

## 2025-06-14 LAB
MONOMERIC PROLACTIN (ICMA)*: 32.6 NG/ML
PERCENT MACROPROLACTIN: 14 %
PROLACTIN, SERUM (ICMA)*: 38 NG/ML

## 2025-08-20 ENCOUNTER — TRANSCRIPTION ENCOUNTER (OUTPATIENT)
Age: 30
End: 2025-08-20

## 2025-09-19 ENCOUNTER — APPOINTMENT (OUTPATIENT)
Dept: INTERNAL MEDICINE | Facility: CLINIC | Age: 30
End: 2025-09-19
Payer: COMMERCIAL

## 2025-09-19 VITALS — SYSTOLIC BLOOD PRESSURE: 86 MMHG | DIASTOLIC BLOOD PRESSURE: 60 MMHG

## 2025-09-19 VITALS — OXYGEN SATURATION: 93 % | HEART RATE: 55 BPM | HEIGHT: 65 IN | WEIGHT: 136 LBS | BODY MASS INDEX: 22.66 KG/M2

## 2025-09-19 DIAGNOSIS — N92.6 IRREGULAR MENSTRUATION, UNSPECIFIED: ICD-10-CM

## 2025-09-19 DIAGNOSIS — R00.2 PALPITATIONS: ICD-10-CM

## 2025-09-19 DIAGNOSIS — Z00.00 ENCOUNTER FOR GENERAL ADULT MEDICAL EXAMINATION W/OUT ABNORMAL FINDINGS: ICD-10-CM

## 2025-09-19 DIAGNOSIS — D35.2 BENIGN NEOPLASM OF PITUITARY GLAND: ICD-10-CM

## 2025-09-19 DIAGNOSIS — I49.9 CARDIAC ARRHYTHMIA, UNSPECIFIED: ICD-10-CM

## 2025-09-19 DIAGNOSIS — Z87.42 PERSONAL HISTORY OF OTHER DISEASES OF THE FEMALE GENITAL TRACT: ICD-10-CM

## 2025-09-19 PROCEDURE — 99395 PREV VISIT EST AGE 18-39: CPT

## 2025-09-19 PROCEDURE — 93010 ELECTROCARDIOGRAM REPORT: CPT

## 2025-09-19 PROCEDURE — 99214 OFFICE O/P EST MOD 30 MIN: CPT | Mod: 25

## 2025-09-20 LAB
ALBUMIN SERPL ELPH-MCNC: 4.6 G/DL
ALP BLD-CCNC: 61 U/L
ALT SERPL-CCNC: 19 U/L
ANION GAP SERPL CALC-SCNC: 13 MMOL/L
AST SERPL-CCNC: 41 U/L
BILIRUB SERPL-MCNC: 0.9 MG/DL
BUN SERPL-MCNC: 10 MG/DL
CALCIUM SERPL-MCNC: 9.3 MG/DL
CHLORIDE SERPL-SCNC: 102 MMOL/L
CHOLEST SERPL-MCNC: 142 MG/DL
CO2 SERPL-SCNC: 21 MMOL/L
CREAT SERPL-MCNC: 0.67 MG/DL
EGFRCR SERPLBLD CKD-EPI 2021: 121 ML/MIN/1.73M2
ESTIMATED AVERAGE GLUCOSE: 111 MG/DL
GLUCOSE SERPL-MCNC: 89 MG/DL
HBA1C MFR BLD HPLC: 5.5 %
HCT VFR BLD CALC: 38.1 %
HDLC SERPL-MCNC: 60 MG/DL
HGB BLD-MCNC: 12.6 G/DL
LDLC SERPL-MCNC: 73 MG/DL
MCHC RBC-ENTMCNC: 30.1 PG
MCHC RBC-ENTMCNC: 33.1 G/DL
MCV RBC AUTO: 91.1 FL
NONHDLC SERPL-MCNC: 82 MG/DL
PLATELET # BLD AUTO: 226 K/UL
POTASSIUM SERPL-SCNC: 4.7 MMOL/L
PROT SERPL-MCNC: 8 G/DL
RBC # BLD: 4.18 M/UL
RBC # FLD: 13.5 %
SODIUM SERPL-SCNC: 137 MMOL/L
T4 FREE SERPL-MCNC: 1.2 NG/DL
TRIGL SERPL-MCNC: 41 MG/DL
TSH SERPL-ACNC: 1.6 UIU/ML
WBC # FLD AUTO: 6.58 K/UL

## 2025-09-26 PROBLEM — R94.31 ABNORMAL EKG: Status: ACTIVE | Noted: 2025-09-26
